# Patient Record
Sex: FEMALE | Race: WHITE | NOT HISPANIC OR LATINO | Employment: OTHER | URBAN - NONMETROPOLITAN AREA
[De-identification: names, ages, dates, MRNs, and addresses within clinical notes are randomized per-mention and may not be internally consistent; named-entity substitution may affect disease eponyms.]

---

## 2019-01-08 ENCOUNTER — OUTSIDE FACILITY SERVICE (OUTPATIENT)
Dept: CARDIOLOGY | Facility: CLINIC | Age: 84
End: 2019-01-08

## 2019-01-10 PROCEDURE — 93227 XTRNL ECG REC<48 HR R&I: CPT | Performed by: INTERNAL MEDICINE

## 2019-01-29 RX ORDER — LANOLIN ALCOHOL/MO/W.PET/CERES
1000 CREAM (GRAM) TOPICAL DAILY
COMMUNITY
End: 2020-04-13

## 2019-01-29 RX ORDER — ALPRAZOLAM 0.5 MG/1
0.5 TABLET ORAL 2 TIMES DAILY PRN
COMMUNITY

## 2019-01-29 RX ORDER — ZOLPIDEM TARTRATE 12.5 MG/1
12.5 TABLET, FILM COATED, EXTENDED RELEASE ORAL NIGHTLY
COMMUNITY

## 2019-01-29 RX ORDER — MELATONIN
1000 DAILY
COMMUNITY
End: 2020-04-13 | Stop reason: ALTCHOICE

## 2019-01-29 RX ORDER — AMLODIPINE BESYLATE AND BENAZEPRIL HYDROCHLORIDE 10; 20 MG/1; MG/1
1 CAPSULE ORAL DAILY
COMMUNITY

## 2019-01-29 RX ORDER — GABAPENTIN 300 MG/1
300 CAPSULE ORAL 3 TIMES DAILY
COMMUNITY

## 2019-01-29 RX ORDER — TRAMADOL HYDROCHLORIDE 50 MG/1
50 TABLET ORAL 3 TIMES DAILY PRN
COMMUNITY

## 2019-02-01 ENCOUNTER — OFFICE VISIT (OUTPATIENT)
Dept: CARDIOLOGY | Facility: CLINIC | Age: 84
End: 2019-02-01

## 2019-02-01 VITALS
HEART RATE: 81 BPM | BODY MASS INDEX: 24.17 KG/M2 | WEIGHT: 136.4 LBS | SYSTOLIC BLOOD PRESSURE: 156 MMHG | DIASTOLIC BLOOD PRESSURE: 82 MMHG | OXYGEN SATURATION: 98 % | HEIGHT: 63 IN

## 2019-02-01 DIAGNOSIS — R00.2 PALPITATIONS: ICD-10-CM

## 2019-02-01 DIAGNOSIS — R94.31 ABNORMAL EKG: ICD-10-CM

## 2019-02-01 DIAGNOSIS — I10 ESSENTIAL HYPERTENSION: ICD-10-CM

## 2019-02-01 DIAGNOSIS — R55 SYNCOPE, UNSPECIFIED SYNCOPE TYPE: ICD-10-CM

## 2019-02-01 DIAGNOSIS — R06.02 SHORTNESS OF BREATH: ICD-10-CM

## 2019-02-01 DIAGNOSIS — R07.89 OTHER CHEST PAIN: Primary | ICD-10-CM

## 2019-02-01 PROCEDURE — 99204 OFFICE O/P NEW MOD 45 MIN: CPT | Performed by: NURSE PRACTITIONER

## 2019-02-01 PROCEDURE — 93000 ELECTROCARDIOGRAM COMPLETE: CPT | Performed by: NURSE PRACTITIONER

## 2019-02-01 NOTE — PROGRESS NOTES
Subjective:     Encounter Date:02/01/2019    Chief Complaint:    Patient ID: Kim Davison is a 83 y.o. female here today for cardiac evaluation at the request of Winifred Kramer NP for syncope.     Syncopal episode that occurred on 12/24/2018 while preparing dinner.  Has not recurred.  She states that she felt funny but did not sit down.  The next thing she knew her  was waking her up on the floor.  She did not seek medical attention.      Heart Problem   This is a new problem. The current episode started more than 1 month ago. The problem occurs rarely. The problem has been resolved. Associated symptoms include chest pain (sometimes has sharp L chest for a few seconds when lying on the couch), numbness (tingling L hand if sitting on the couch or lying on her arm) and weakness (for several months, all over - she attributes to her age). Pertinent negatives include no abdominal pain, chills, congestion, coughing, fever, headaches, joint swelling, nausea, neck pain, rash or vomiting. Nothing aggravates the symptoms. She has tried rest for the symptoms. The treatment provided moderate relief.     History:   Past Medical History:   Diagnosis Date   • Abnormal EKG 2/1/2019   • Heart murmur    • Hyperglycemia    • Hypertension    • Insomnia    • Neuropathy    • Skin cancer     unknown what type     Past Surgical History:   Procedure Laterality Date   • APPENDECTOMY  03/20/2007   • BREAST BIOPSY     • CATARACT EXTRACTION      with lens implants   • HEMORRHOIDECTOMY  03/20/2007   • LUMBAR SPINE SURGERY  2002   • SKIN CANCER EXCISION       Social History     Socioeconomic History   • Marital status:      Spouse name: Not on file   • Number of children: Not on file   • Years of education: Not on file   • Highest education level: Not on file   Social Needs   • Financial resource strain: Not on file   • Food insecurity - worry: Not on file   • Food insecurity - inability: Not on file   • Transportation needs -  medical: Not on file   • Transportation needs - non-medical: Not on file   Occupational History   • Not on file   Tobacco Use   • Smoking status: Current Every Day Smoker     Packs/day: 0.25     Years: 10.00     Pack years: 2.50     Types: Cigarettes     Start date: 1956   • Smokeless tobacco: Never Used   • Tobacco comment: smokes 3 per day, less than 1/2 ppd for years - only able to completely quit for 2-3 months at a time, quit several times   Substance and Sexual Activity   • Alcohol use: No     Frequency: Never   • Drug use: No   • Sexual activity: Defer   Other Topics Concern   • Not on file   Social History Narrative   • Not on file     Family History   Problem Relation Age of Onset   • Stomach cancer Mother    • Heart attack Father    • Lymphoma Sister    • Heart disease Brother        Outpatient Medications Marked as Taking for the 2/1/19 encounter (Office Visit) with Odilia Cline APRN   Medication Sig Dispense Refill   • ALPRAZolam (XANAX) 0.5 MG tablet Take 0.5 mg by mouth 2 (Two) Times a Day As Needed.     • amLODIPine-benazepril (LOTREL) 10-20 MG per capsule Take 1 capsule by mouth Daily.     • cholecalciferol (VITAMIN D3) 1000 units tablet Take 1,000 Units by mouth Daily.     • gabapentin (NEURONTIN) 300 MG capsule Take 300 mg by mouth 3 (Three) Times a Day.     • traMADol (ULTRAM) 50 MG tablet Take 50 mg by mouth 3 (Three) Times a Day As Needed.     • vitamin B-12 (CYANOCOBALAMIN) 1000 MCG tablet Take 1,000 mcg by mouth Daily.     • zolpidem CR (AMBIEN CR) 12.5 MG CR tablet Take 12.5 mg by mouth Every Night.         Review of Systems:  Review of Systems   Constitution: Positive for weakness (for several months, all over - she attributes to her age) and malaise/fatigue. Negative for chills, decreased appetite and fever.   HENT: Negative for congestion and nosebleeds.    Eyes: Negative for blurred vision and double vision.   Cardiovascular: Positive for chest pain (sometimes has sharp L chest  "for a few seconds when lying on the couch), irregular heartbeat, near-syncope, palpitations and syncope. Negative for leg swelling.   Respiratory: Positive for shortness of breath (walking up steps - doesn't have to stop and rest, not exercising regularly). Negative for cough.    Endocrine: Negative for cold intolerance and heat intolerance.   Skin: Negative for dry skin and rash.   Musculoskeletal: Positive for back pain (went to [facility] but didn't keep doing exercises they recommended). Negative for falls, joint pain, joint swelling, neck pain and stiffness.   Gastrointestinal: Negative for abdominal pain, constipation, diarrhea, heartburn, nausea and vomiting.   Neurological: Positive for loss of balance and numbness (tingling L hand if sitting on the couch or lying on her arm). Negative for dizziness, headaches and light-headedness.   Psychiatric/Behavioral: Positive for depression. The patient has insomnia and is nervous/anxious ( worse in the afternoon, gets \"all keyed\" up - better with anxiety pill).             Objective:   /82 (BP Location: Left arm, Patient Position: Sitting, Cuff Size: Adult)   Pulse 81   Ht 160 cm (63\")   Wt 61.9 kg (136 lb 6.4 oz)   SpO2 98%   BMI 24.16 kg/m²   Wt Readings from Last 3 Encounters:   02/01/19 61.9 kg (136 lb 6.4 oz)         Physical Exam   Constitutional: She is oriented to person, place, and time. She appears well-developed and well-nourished.   HENT:   Head: Normocephalic and atraumatic.   Right Ear: External ear normal.   Left Ear: External ear normal.   Nose: Nose normal.   Mouth/Throat: Oropharynx is clear and moist.   Eyes: Conjunctivae and EOM are normal. Pupils are equal, round, and reactive to light. Right eye exhibits no discharge. Left eye exhibits no discharge. No scleral icterus.   Neck: Normal range of motion. Neck supple. No JVD present. No tracheal deviation present. No thyromegaly present.   Cardiovascular: Normal rate and regular rhythm. Exam " reveals no gallop and no friction rub.   No murmur heard.  Pulmonary/Chest: Effort normal and breath sounds normal. She has no wheezes. She has no rales.   Abdominal: Soft. Bowel sounds are normal. She exhibits no mass. There is no tenderness. There is no rebound and no guarding.   Musculoskeletal: She exhibits tenderness (BLEs). She exhibits no edema or deformity.   Lymphadenopathy:     She has no cervical adenopathy.   Neurological: She is alert and oriented to person, place, and time. No cranial nerve deficit.   Skin: Skin is warm and dry.   Psychiatric: She has a normal mood and affect. Her behavior is normal. Judgment and thought content normal.   Vitals reviewed.    Lab/Diagnostics Review:   1/8/2019 24-hour Holter monitor sinus rhythm with rare atrial and ventricular ectopic beats.  No significant pauses, arrhythmias, or events.  No symptoms reported.  A benign monitor study.  As read by Dr. Heath Hull.    7/11/2018 BMP sodium 144 potassium 4.5 chloride 106 CO2 28 creatinine 0.8 BUN 14 calcium 9.6 glucose 119    7/9/2018  CBC WBC 4000, hemoglobin 15, hematocrit 46%, platelets 303,000  Lipid panel total cholesterol 190 triglycerides 112 HDL 61   TSH 1.39 free T4 0.89 free T3 2.67      ECG 12 Lead  Date/Time: 2/1/2019 9:27 AM  Performed by: Odilia Cline APRN  Authorized by: Odilia Cline APRN   Comparison: compared with previous ECG from 7/24/2017  Similar to previous ECG  Comparison to previous ECG: Poor copy quality of previous EKG but no gross changes  Rhythm: sinus rhythm  Rate: normal  BPM: 82  QRS axis: left  Clinical impression: abnormal ECG                Assessment/Plan:         Problem List Items Addressed This Visit        Cardiovascular and Mediastinum    Syncope    Current Assessment & Plan     Unclear etiology. Will r/o myocardial ischemia and VHD.          Relevant Orders    Adult Transthoracic Echo Complete W/ Cont if Necessary Per Protocol    Adult Stress Echo W/ Cont or  Stress Agent if Necessary Per Protocol    Palpitations    Current Assessment & Plan     No significant arrhythmia on 24 Hr Holter         Relevant Orders    Adult Transthoracic Echo Complete W/ Cont if Necessary Per Protocol    Adult Stress Echo W/ Cont or Stress Agent if Necessary Per Protocol    Abnormal EKG    Relevant Orders    Adult Transthoracic Echo Complete W/ Cont if Necessary Per Protocol    Adult Stress Echo W/ Cont or Stress Agent if Necessary Per Protocol    Hypertension    Current Assessment & Plan     Not controlled per today's reading. To check at home at least 3 times per week and call readings. May need to increase or add medications.          Relevant Medications    amLODIPine-benazepril (LOTREL) 10-20 MG per capsule    Other Relevant Orders    Adult Transthoracic Echo Complete W/ Cont if Necessary Per Protocol    Adult Stress Echo W/ Cont or Stress Agent if Necessary Per Protocol       Respiratory    Shortness of breath    Relevant Orders    Adult Transthoracic Echo Complete W/ Cont if Necessary Per Protocol    Adult Stress Echo W/ Cont or Stress Agent if Necessary Per Protocol       Nervous and Auditory    Other chest pain - Primary    Current Assessment & Plan     Somewhat atypical but with other symptoms and risk factors. Check echocardiogram and stress echocardiogram.          Relevant Orders    ECG 12 Lead    Adult Transthoracic Echo Complete W/ Cont if Necessary Per Protocol    Adult Stress Echo W/ Cont or Stress Agent if Necessary Per Protocol        Return in about 2 months (around 4/1/2019) for Recheck.           Odilia Cline, APRN, ACNP-BC, CHFN-BC

## 2019-02-01 NOTE — ASSESSMENT & PLAN NOTE
Not controlled per today's reading. To check at home at least 3 times per week and call readings. May need to increase or add medications.

## 2019-02-01 NOTE — PATIENT INSTRUCTIONS
Nonspecific Chest Pain  Chest pain can be caused by many different conditions. There is always a chance that your pain could be related to something serious, such as a heart attack or a blood clot in your lungs. Chest pain can also be caused by conditions that are not life-threatening. If you have chest pain, it is very important to follow up with your health care provider.  What are the causes?  Causes of this condition include:  · Heartburn.  · Pneumonia or bronchitis.  · Anxiety or stress.  · Inflammation around your heart (pericarditis) or lung (pleuritis or pleurisy).  · A blood clot in your lung.  · A collapsed lung (pneumothorax). This can develop suddenly on its own (spontaneous pneumothorax) or from trauma to the chest.  · Shingles infection (varicella-zoster virus).  · Heart attack.  · Damage to the bones, muscles, and cartilage that make up your chest wall. This can include:  ? Bruised bones due to injury.  ? Strained muscles or cartilage due to frequent or repeated coughing or overwork.  ? Fracture to one or more ribs.  ? Sore cartilage due to inflammation (costochondritis).    What increases the risk?  Risk factors for this condition may include:  · Activities that increase your risk for trauma or injury to your chest.  · Respiratory infections or conditions that cause frequent coughing.  · Medical conditions or overeating that can cause heartburn.  · Heart disease or family history of heart disease.  · Conditions or health behaviors that increase your risk of developing a blood clot.  · Having had chicken pox (varicella zoster).    What are the signs or symptoms?  Chest pain can feel like:  · Burning or tingling on the surface of your chest or deep in your chest.  · Crushing, pressure, aching, or squeezing pain.  · Dull or sharp pain that is worse when you move, cough, or take a deep breath.  · Pain that is also felt in your back, neck, shoulder, or arm, or pain that spreads to any of these  areas.    Your chest pain may come and go, or it may stay constant.  How is this diagnosed?  Lab tests or other studies may be needed to find the cause of your pain. Your health care provider may have you take a test called an ECG (electrocardiogram). An ECG records your heartbeat patterns at the time the test is performed. You may also have other tests, such as:  · Transthoracic echocardiogram (TTE). In this test, sound waves are used to create a picture of the heart structures and to look at how blood flows through your heart.  · Transesophageal echocardiogram (VALERIA). This is a more advanced imaging test that takes images from inside your body. It allows your health care provider to see your heart in finer detail.  · Cardiac monitoring. This allows your health care provider to monitor your heart rate and rhythm in real time.  · Holter monitor. This is a portable device that records your heartbeat and can help to diagnose abnormal heartbeats. It allows your health care provider to track your heart activity for several days, if needed.  · Stress tests. These can be done through exercise or by taking medicine that makes your heart beat more quickly.  · Blood tests.  · Other imaging tests.    How is this treated?  Treatment depends on what is causing your chest pain. Treatment may include:  · Medicines. These may include:  ? Acid blockers for heartburn.  ? Anti-inflammatory medicine.  ? Pain medicine for inflammatory conditions.  ? Antibiotic medicine, if an infection is present.  ? Medicines to dissolve blood clots.  ? Medicines to treat coronary artery disease (CAD).  · Supportive care for conditions that do not require medicines. This may include:  ? Resting.  ? Applying heat or cold packs to injured areas.  ? Limiting activities until pain decreases.    Follow these instructions at home:  Medicines  · If you were prescribed an antibiotic, take it as told by your health care provider. Do not stop taking the  antibiotic even if you start to feel better.  · Take over-the-counter and prescription medicines only as told by your health care provider.  Lifestyle  · Do not use any products that contain nicotine or tobacco, such as cigarettes and e-cigarettes. If you need help quitting, ask your health care provider.  · Do not drink alcohol.  · Make lifestyle changes as directed by your health care provider. These may include:  ? Getting regular exercise. Ask your health care provider to suggest some activities that are safe for you.  ? Eating a heart-healthy diet. A registered dietitian can help you to learn healthy eating options.  ? Maintaining a healthy weight.  ? Managing diabetes, if necessary.  ? Reducing stress, such as with yoga or relaxation techniques.  General instructions  · Avoid any activities that bring on chest pain.  · If heartburn is the cause for your chest pain, raise (elevate) the head of your bed about 6 inches (15 cm) by putting blocks under the legs. Sleeping with more pillows does not effectively relieve heartburn because it only changes the position of your head.  · Keep all follow-up visits as told by your health care provider. This is important. This includes any further testing if your chest pain does not go away.  Contact a health care provider if:  · Your chest pain does not go away.  · You have a rash with blisters on your chest.  · You have a fever.  · You have chills.  Get help right away if:  · Your chest pain is worse.  · You have a cough that gets worse, or you cough up blood.  · You have severe pain in your abdomen.  · You have severe weakness.  · You faint.  · You have sudden, unexplained chest discomfort.  · You have sudden, unexplained discomfort in your arms, back, neck, or jaw.  · You have shortness of breath at any time.  · You suddenly start to sweat, or your skin gets clammy.  · You feel nauseous or you vomit.  · You suddenly feel light-headed or dizzy.  · Your heart begins to beat  quickly, or it feels like it is skipping beats.  These symptoms may represent a serious problem that is an emergency. Do not wait to see if the symptoms will go away. Get medical help right away. Call your local emergency services (911 in the [place] Do not drive yourself to the hospital.  This information is not intended to replace advice given to you by your health care provider. Make sure you discuss any questions you have with your health care provider.  Document Released: 09/27/2006 Document Revised: 09/11/2017 Document Reviewed: 09/11/2017  [employer] Patient Education © 2017 [employer]  Syncope  Syncope is when you temporarily lose consciousness. Syncope may also be called fainting or passing out. It is caused by a sudden decrease in blood flow to the brain. Even though most causes of syncope are not dangerous, syncope can be a sign of a serious medical problem. Signs that you may be about to faint include:  · Feeling dizzy or light-headed.  · Feeling nauseous.  · Seeing all white or all black in your field of vision.  · Having cold, clammy skin.    If you fainted, get medical help right away.Call your local emergency services (911 in the [place] Do not drive yourself to the hospital.  Follow these instructions at home:  Pay attention to any changes in your symptoms. Take these actions to help with your condition:  · Have someone stay with you until you feel stable.  · Do not drive, use machinery, or play sports until your health care provider says it is okay.  · Keep all follow-up visits as told by your health care provider. This is important.  · If you start to feel like you might faint, lie down right away and raise (elevate) your feet above the level of your heart. Breathe deeply and steadily. Wait until all of the symptoms have passed.  · Drink enough fluid to keep your urine clear or pale yellow.  · If you are taking blood pressure or heart medicine, get up slowly and take several minutes to  sit and then stand. This can reduce dizziness.  · Take over-the-counter and prescription medicines only as told by your health care provider.    Get help right away if:  · You have a severe headache.  · You have unusual pain in your chest, abdomen, or back.  · You are bleeding from your mouth or rectum, or you have black or tarry stool.  · You have a very fast or irregular heartbeat (palpitations).  · You have pain with breathing.  · You faint once or repeatedly.  · You have a seizure.  · You are confused.  · You have trouble walking.  · You have severe weakness.  · You have vision problems.  These symptoms may represent a serious problem that is an emergency. Do not wait to see if your symptoms will go away. Get medical help right away. Call your local emergency services (911 in the [place] Do not drive yourself to the hospital.  This information is not intended to replace advice given to you by your health care provider. Make sure you discuss any questions you have with your health care provider.  Document Released: 12/18/2006 Document Revised: 05/25/2017 Document Reviewed: 08/31/2016  [employer] Interactive Patient Education © 2018 [employer]

## 2019-02-13 ENCOUNTER — OUTSIDE FACILITY SERVICE (OUTPATIENT)
Dept: CARDIOLOGY | Facility: CLINIC | Age: 84
End: 2019-02-13

## 2019-02-13 PROCEDURE — 93010 ELECTROCARDIOGRAM REPORT: CPT | Performed by: INTERNAL MEDICINE

## 2019-02-14 DIAGNOSIS — R94.31 ABNORMAL EKG: ICD-10-CM

## 2019-02-14 DIAGNOSIS — R55 SYNCOPE, UNSPECIFIED SYNCOPE TYPE: ICD-10-CM

## 2019-02-14 DIAGNOSIS — R06.02 SHORTNESS OF BREATH: ICD-10-CM

## 2019-02-14 DIAGNOSIS — R07.89 OTHER CHEST PAIN: ICD-10-CM

## 2019-02-14 DIAGNOSIS — I10 ESSENTIAL HYPERTENSION: ICD-10-CM

## 2019-02-14 DIAGNOSIS — R00.2 PALPITATIONS: ICD-10-CM

## 2019-02-14 NOTE — PROGRESS NOTES
Please notify patient of fairly normal echocardiogram and stress echocardiogram is low risk of ischemia. Nothing to explain her syncopal episode. TX!

## 2019-04-08 ENCOUNTER — OFFICE VISIT (OUTPATIENT)
Dept: CARDIOLOGY | Facility: CLINIC | Age: 84
End: 2019-04-08

## 2019-04-08 VITALS
WEIGHT: 137 LBS | SYSTOLIC BLOOD PRESSURE: 122 MMHG | DIASTOLIC BLOOD PRESSURE: 72 MMHG | HEART RATE: 98 BPM | HEIGHT: 63 IN | BODY MASS INDEX: 24.27 KG/M2 | OXYGEN SATURATION: 97 %

## 2019-04-08 DIAGNOSIS — R55 SYNCOPE, UNSPECIFIED SYNCOPE TYPE: Primary | ICD-10-CM

## 2019-04-08 DIAGNOSIS — R00.2 PALPITATIONS: ICD-10-CM

## 2019-04-08 DIAGNOSIS — I10 ESSENTIAL HYPERTENSION: Chronic | ICD-10-CM

## 2019-04-08 PROBLEM — R07.89 OTHER CHEST PAIN: Status: RESOLVED | Noted: 2019-02-01 | Resolved: 2019-04-08

## 2019-04-08 PROCEDURE — 99214 OFFICE O/P EST MOD 30 MIN: CPT | Performed by: NURSE PRACTITIONER

## 2019-04-08 NOTE — ASSESSMENT & PLAN NOTE
Check tilt table test and extended Holter monitor with diary. If unremarkable will consider referral to neurology.

## 2019-04-08 NOTE — PROGRESS NOTES
"    Subjective:     Encounter Date:04/08/2019    Chief Complaint:    Patient ID: Kim Davison is a 83 y.o. female here today for 2 month cardiac follow-up.    HPI     Chest Pain      Additional comments: low risk SE and echo normal for her age              Loss of Consciousness      Additional comments: passed out again last week after getting up to change clothes in the middle of the night, doesn't remember falling or hitting L eye orbit (did not seek medical attention), no warning,  heard her fall              Palpitations      Additional comments: feels like heart races almost every afternoon for several minutes - aggravating or relieving factors - no significant arrhythmia on previous 24 Hr Holter              Hypertension      Additional comments: hasn't been checking at home, has been \"alright\" at office visits          Last edited by Odilia Cline APRN on 4/8/2019 10:22 AM. (History)        History:   Past Medical History:   Diagnosis Date   • Abnormal EKG 2/1/2019   • Heart murmur    • Hyperglycemia    • Hypertension    • Insomnia    • Neuropathy    • Skin cancer     unknown what type   • Syncope      Past Surgical History:   Procedure Laterality Date   • APPENDECTOMY  03/20/2007   • BREAST BIOPSY     • CATARACT EXTRACTION      with lens implants   • HEMORRHOIDECTOMY  03/20/2007   • LUMBAR SPINE SURGERY  2002   • SKIN CANCER EXCISION       Social History     Socioeconomic History   • Marital status:      Spouse name: Not on file   • Number of children: Not on file   • Years of education: Not on file   • Highest education level: Not on file   Tobacco Use   • Smoking status: Current Every Day Smoker     Packs/day: 0.25     Years: 10.00     Pack years: 2.50     Types: Cigarettes     Start date: 1956   • Smokeless tobacco: Never Used   • Tobacco comment: smokes 1-3 cigs per day, less than 1/2 ppd for years - only able to completely quit for 2-3 months at a time, quit several times "   Substance and Sexual Activity   • Alcohol use: No     Frequency: Never   • Drug use: No   • Sexual activity: Defer     Family History   Problem Relation Age of Onset   • Stomach cancer Mother    • Heart attack Father    • Lymphoma Sister    • Heart disease Brother        Outpatient Medications Marked as Taking for the 4/8/19 encounter (Office Visit) with Odilia Cline APRN   Medication Sig Dispense Refill   • ALPRAZolam (XANAX) 0.5 MG tablet Take 0.5 mg by mouth 2 (Two) Times a Day As Needed.     • amLODIPine-benazepril (LOTREL) 10-20 MG per capsule Take 1 capsule by mouth Daily.     • cholecalciferol (VITAMIN D3) 1000 units tablet Take 1,000 Units by mouth Daily.     • gabapentin (NEURONTIN) 300 MG capsule Take 300 mg by mouth 3 (Three) Times a Day.     • traMADol (ULTRAM) 50 MG tablet Take 50 mg by mouth 3 (Three) Times a Day As Needed.     • vitamin B-12 (CYANOCOBALAMIN) 1000 MCG tablet Take 1,000 mcg by mouth Daily.     • zolpidem CR (AMBIEN CR) 12.5 MG CR tablet Take 12.5 mg by mouth Every Night.         Review of Systems:  Review of Systems   Constitution: Positive for weakness (for several months, all over - she attributes to her age) and malaise/fatigue. Negative for chills, decreased appetite and fever.   HENT: Negative for congestion and nosebleeds.    Eyes: Negative for blurred vision and double vision.   Cardiovascular: Positive for irregular heartbeat, near-syncope, palpitations and syncope. Negative for chest pain, dyspnea on exertion and leg swelling.   Respiratory: Negative for cough and shortness of breath (walking up steps - doesn't have to stop and rest, not exercising regularly ).    Endocrine: Negative for cold intolerance and heat intolerance.   Hematologic/Lymphatic: Bruises/bleeds easily.   Skin: Negative for dry skin and rash.   Musculoskeletal: Positive for back pain (went to [facility] but didn't keep doing exercises they recommended) and falls. Negative for joint pain, joint swelling,  "neck pain and stiffness.   Gastrointestinal: Negative for abdominal pain, constipation, diarrhea, heartburn, nausea and vomiting.   Neurological: Positive for loss of balance and numbness (tingling L hand if sitting on the couch or lying on her arm). Negative for dizziness, headaches and light-headedness.   Psychiatric/Behavioral: Positive for depression. The patient has insomnia and is nervous/anxious ( worse in the afternoon, gets \"all keyed\" up - better with anxiety pill).           Objective:   /72 (BP Location: Left arm, Patient Position: Sitting, Cuff Size: Adult)   Pulse 98   Ht 160 cm (63\")   Wt 62.1 kg (137 lb)   SpO2 97%   BMI 24.27 kg/m²   Wt Readings from Last 3 Encounters:   04/08/19 62.1 kg (137 lb)   02/01/19 61.9 kg (136 lb 6.4 oz)         Physical Exam   Constitutional: She is oriented to person, place, and time. She appears well-developed and well-nourished.   Eyes: No scleral icterus.   Neck: Normal carotid pulses and no JVD present. Carotid bruit is not present.   Cardiovascular: Normal rate, regular rhythm, normal heart sounds and intact distal pulses. Exam reveals no gallop and no friction rub.   No murmur heard.  Pulmonary/Chest: Effort normal and breath sounds normal.   Musculoskeletal: She exhibits tenderness (BLEs). She exhibits no edema.   Neurological: She is alert and oriented to person, place, and time.   Skin: Skin is warm and dry.   Psychiatric: She has a normal mood and affect.   Vitals reviewed.      Lab/Diagnostics Review:   2/13/2019  Stress echocardiogram clinically and electrically negative for ischemia, functional capacity is fair, low risk for ischemia   Echocardiogram EF 60-65% grade 1 diastolic dysfunction, mild AI, normal RV size and function, trivial MR, trivial PI.    1/8/2019 24-hour Holter monitor sinus rhythm with rare atrial and ventricular ectopic beats.  No significant pauses, arrhythmias, or events.  No symptoms reported.  A benign monitor study.  As " read by Dr. Heath Hull.     7/11/2018 BMP sodium 144 potassium 4.5 chloride 106 CO2 28 creatinine 0.8 BUN 14 calcium 9.6 glucose 119     7/9/2018  CBC WBC 4000, hemoglobin 15, hematocrit 46%, platelets 303,000  Lipid panel total cholesterol 190 triglycerides 112 HDL 61   TSH 1.39 free T4 0.89 free T3 2.67      ECG 12 Lead  Date/Time: 2/1/2019 9:27 AM  Performed by: Odilia Cline APRN  Authorized by: Odilia Cline APRN   Comparison: compared with previous ECG from 7/24/2017  Similar to previous ECG  Comparison to previous ECG: Poor copy quality of previous EKG but no gross changes  Rhythm: sinus rhythm  Rate: normal  BPM: 82  QRS axis: left  Clinical impression: abnormal ECG    Procedures: none in office today        Assessment/Plan:         Problem List Items Addressed This Visit        Cardiovascular and Mediastinum    Syncope - Primary (Chronic)    Current Assessment & Plan     Check tilt table test and extended Holter monitor with diary. If unremarkable will consider referral to neurology.          Relevant Orders    Tilt Table    Holter Monitor - 72 Hour Up To 21 Days    Palpitations (Chronic)    Current Assessment & Plan     Check extended 14 day Holter with diary.          Relevant Orders    Holter Monitor - 72 Hour Up To 21 Days    Hypertension (Chronic)    Current Assessment & Plan     Well controlled with medications. Continue present therapy but avoid hypotension given syncope.                Return in about 3 months (around 7/8/2019) for Recheck.           RAVEN Rose, ACNP-BC, CHFN-BC

## 2019-04-08 NOTE — PATIENT INSTRUCTIONS
Syncope  Syncope is when you temporarily lose consciousness. Syncope may also be called fainting or passing out. It is caused by a sudden decrease in blood flow to the brain. Even though most causes of syncope are not dangerous, syncope can be a sign of a serious medical problem. Signs that you may be about to faint include:  · Feeling dizzy or light-headed.  · Feeling nauseous.  · Seeing all white or all black in your field of vision.  · Having cold, clammy skin.    If you fainted, get medical help right away.Call your local emergency services (911 in the [place] Do not drive yourself to the hospital.  Follow these instructions at home:  Pay attention to any changes in your symptoms. Take these actions to help with your condition:  · Have someone stay with you until you feel stable.  · Do not drive, use machinery, or play sports until your health care provider says it is okay.  · Keep all follow-up visits as told by your health care provider. This is important.  · If you start to feel like you might faint, lie down right away and raise (elevate) your feet above the level of your heart. Breathe deeply and steadily. Wait until all of the symptoms have passed.  · Drink enough fluid to keep your urine clear or pale yellow.  · If you are taking blood pressure or heart medicine, get up slowly and take several minutes to sit and then stand. This can reduce dizziness.  · Take over-the-counter and prescription medicines only as told by your health care provider.    Get help right away if:  · You have a severe headache.  · You have unusual pain in your chest, abdomen, or back.  · You are bleeding from your mouth or rectum, or you have black or tarry stool.  · You have a very fast or irregular heartbeat (palpitations).  · You have pain with breathing.  · You faint once or repeatedly.  · You have a seizure.  · You are confused.  · You have trouble walking.  · You have severe weakness.  · You have vision problems.  These  symptoms may represent a serious problem that is an emergency. Do not wait to see if your symptoms will go away. Get medical help right away. Call your local emergency services (911 in the [place] Do not drive yourself to the hospital.  This information is not intended to replace advice given to you by your health care provider. Make sure you discuss any questions you have with your health care provider.  Document Released: 12/18/2006 Document Revised: 05/25/2017 Document Reviewed: 08/31/2016  [employer]vier Interactive Patient Education © 2018 [employer]

## 2019-04-24 ENCOUNTER — OUTSIDE FACILITY SERVICE (OUTPATIENT)
Dept: CARDIOLOGY | Facility: CLINIC | Age: 84
End: 2019-04-24

## 2019-04-24 PROCEDURE — 93660 TILT TABLE EVALUATION: CPT | Performed by: INTERNAL MEDICINE

## 2019-04-26 DIAGNOSIS — R55 SYNCOPE, UNSPECIFIED SYNCOPE TYPE: ICD-10-CM

## 2019-04-26 NOTE — PROGRESS NOTES
Please notify patient her tilt table test was normal except it did show her BP drops with standing. She needs to wear compression socks, stay hydrated, move her legs and feet before standing, get up slowly, and not sit for long periods of time. Await results of heart monitor. If doesn't improve with conservative measures she needs to call - may need to stop the HCTZ in her BP med. TX!

## 2019-07-08 ENCOUNTER — OFFICE VISIT (OUTPATIENT)
Dept: CARDIOLOGY | Facility: CLINIC | Age: 84
End: 2019-07-08

## 2019-07-08 VITALS
HEIGHT: 63 IN | DIASTOLIC BLOOD PRESSURE: 100 MMHG | HEART RATE: 86 BPM | BODY MASS INDEX: 23.88 KG/M2 | WEIGHT: 134.8 LBS | OXYGEN SATURATION: 96 % | SYSTOLIC BLOOD PRESSURE: 172 MMHG

## 2019-07-08 DIAGNOSIS — I44.1 ATRIOVENTRICULAR BLOCK, MOBITZ TYPE 1, WENCKEBACH: Chronic | ICD-10-CM

## 2019-07-08 DIAGNOSIS — R55 SYNCOPE, UNSPECIFIED SYNCOPE TYPE: Chronic | ICD-10-CM

## 2019-07-08 DIAGNOSIS — I47.1 SVT (SUPRAVENTRICULAR TACHYCARDIA) (HCC): Chronic | ICD-10-CM

## 2019-07-08 DIAGNOSIS — I95.1 ORTHOSTASIS: Chronic | ICD-10-CM

## 2019-07-08 DIAGNOSIS — I10 ESSENTIAL HYPERTENSION: Primary | Chronic | ICD-10-CM

## 2019-07-08 DIAGNOSIS — R00.2 PALPITATIONS: Chronic | ICD-10-CM

## 2019-07-08 PROBLEM — I47.10 SVT (SUPRAVENTRICULAR TACHYCARDIA): Chronic | Status: ACTIVE | Noted: 2019-07-08

## 2019-07-08 PROCEDURE — 99214 OFFICE O/P EST MOD 30 MIN: CPT | Performed by: NURSE PRACTITIONER

## 2019-07-08 PROCEDURE — 93000 ELECTROCARDIOGRAM COMPLETE: CPT | Performed by: NURSE PRACTITIONER

## 2019-07-08 RX ORDER — HYDROCHLOROTHIAZIDE 25 MG/1
25 TABLET ORAL DAILY
Qty: 30 TABLET | Refills: 11 | Status: SHIPPED | OUTPATIENT
Start: 2019-07-08 | End: 2020-07-07

## 2019-07-08 RX ORDER — ASPIRIN 81 MG/1
81 TABLET ORAL DAILY
COMMUNITY

## 2019-07-08 NOTE — PATIENT INSTRUCTIONS
"DASH Eating Plan  DASH stands for \"Dietary Approaches to Stop Hypertension.\" The DASH eating plan is a healthy eating plan that has been shown to reduce high blood pressure (hypertension). It may also reduce your risk for type 2 diabetes, heart disease, and stroke. The DASH eating plan may also help with weight loss.  What are tips for following this plan?  General guidelines  · Avoid eating more than 2,300 mg (milligrams) of salt (sodium) a day. If you have hypertension, you may need to reduce your sodium intake to 1,500 mg a day.  · Limit alcohol intake to no more than 1 drink a day for nonpregnant women and 2 drinks a day for men. One drink equals 12 oz of beer, 5 oz of wine, or 1½ oz of hard liquor.  · Work with your health care provider to maintain a healthy body weight or to lose weight. Ask what an ideal weight is for you.  · Get at least 30 minutes of exercise that causes your heart to beat faster (aerobic exercise) most days of the week. Activities may include walking, swimming, or biking.  · Work with your health care provider or diet and nutrition specialist (dietitian) to adjust your eating plan to your individual calorie needs.  Reading food labels  · Check food labels for the amount of sodium per serving. Choose foods with less than 5 percent of the Daily Value of sodium. Generally, foods with less than 300 mg of sodium per serving fit into this eating plan.  · To find whole grains, look for the word \"whole\" as the first word in the ingredient list.  Shopping  · Buy products labeled as \"low-sodium\" or \"no salt added.\"  · Buy fresh foods. Avoid canned foods and premade or frozen meals.  Cooking  · Avoid adding salt when cooking. Use salt-free seasonings or herbs instead of table salt or sea salt. Check with your health care provider or pharmacist before using salt substitutes.  · Do not ny foods. Cook foods using healthy methods such as baking, boiling, grilling, and broiling instead.  · Cook with " heart-healthy oils, such as olive, canola, soybean, or sunflower oil.  Meal planning    · Eat a balanced diet that includes:  ? 5 or more servings of fruits and vegetables each day. At each meal, try to fill half of your plate with fruits and vegetables.  ? Up to 6-8 servings of whole grains each day.  ? Less than 6 oz of lean meat, poultry, or fish each day. A 3-oz serving of meat is about the same size as a deck of cards. One egg equals 1 oz.  ? 2 servings of low-fat dairy each day.  ? A serving of nuts, seeds, or beans 5 times each week.  ? Heart-healthy fats. Healthy fats called Omega-3 fatty acids are found in foods such as flaxseeds and coldwater fish, like sardines, salmon, and mackerel.  · Limit how much you eat of the following:  ? Canned or prepackaged foods.  ? Food that is high in trans fat, such as fried foods.  ? Food that is high in saturated fat, such as fatty meat.  ? Sweets, desserts, sugary drinks, and other foods with added sugar.  ? Full-fat dairy products.  · Do not salt foods before eating.  · Try to eat at least 2 vegetarian meals each week.  · Eat more home-cooked food and less restaurant, buffet, and fast food.  · When eating at a restaurant, ask that your food be prepared with less salt or no salt, if possible.  What foods are recommended?  The items listed may not be a complete list. Talk with your dietitian about what dietary choices are best for you.  Grains  Whole-grain or whole-wheat bread. Whole-grain or whole-wheat pasta. Brown rice. Oatmeal. Quinoa. Bulgur. Whole-grain and low-sodium cereals. Carlyn bread. Low-fat, low-sodium crackers. Whole-wheat flour tortillas.  Vegetables  Fresh or frozen vegetables (raw, steamed, roasted, or grilled). Low-sodium or reduced-sodium tomato and vegetable juice. Low-sodium or reduced-sodium tomato sauce and tomato paste. Low-sodium or reduced-sodium canned vegetables.  Fruits  All fresh, dried, or frozen fruit. Canned fruit in natural juice (without  added sugar).  Meat and other protein foods  Skinless chicken or turkey. Ground chicken or turkey. Pork with fat trimmed off. Fish and seafood. Egg whites. Dried beans, peas, or lentils. Unsalted nuts, nut butters, and seeds. Unsalted canned beans. Lean cuts of beef with fat trimmed off. Low-sodium, lean deli meat.  Dairy  Low-fat (1%) or fat-free (skim) milk. Fat-free, low-fat, or reduced-fat cheeses. Nonfat, low-sodium ricotta or cottage cheese. Low-fat or nonfat yogurt. Low-fat, low-sodium cheese.  Fats and oils  Soft margarine without trans fats. Vegetable oil. Low-fat, reduced-fat, or light mayonnaise and salad dressings (reduced-sodium). Canola, safflower, olive, soybean, and sunflower oils. Avocado.  Seasoning and other foods  Herbs. Spices. Seasoning mixes without salt. Unsalted popcorn and pretzels. Fat-free sweets.  What foods are not recommended?  The items listed may not be a complete list. Talk with your dietitian about what dietary choices are best for you.  Grains  Baked goods made with fat, such as croissants, muffins, or some breads. Dry pasta or rice meal packs.  Vegetables  Creamed or fried vegetables. Vegetables in a cheese sauce. Regular canned vegetables (not low-sodium or reduced-sodium). Regular canned tomato sauce and paste (not low-sodium or reduced-sodium). Regular tomato and vegetable juice (not low-sodium or reduced-sodium). Pickles. Olives.  Fruits  Canned fruit in a light or heavy syrup. Fried fruit. Fruit in cream or butter sauce.  Meat and other protein foods  Fatty cuts of meat. Ribs. Fried meat. Kaiser. Sausage. Bologna and other processed lunch meats. Salami. Fatback. Hotdogs. Bratwurst. Salted nuts and seeds. Canned beans with added salt. Canned or smoked fish. Whole eggs or egg yolks. Chicken or turkey with skin.  Dairy  Whole or 2% milk, cream, and half-and-half. Whole or full-fat cream cheese. Whole-fat or sweetened yogurt. Full-fat cheese. Nondairy creamers. Whipped toppings.  Processed cheese and cheese spreads.  Fats and oils  Butter. Stick margarine. Lard. Shortening. Ghee. Kaiser fat. Tropical oils, such as coconut, palm kernel, or palm oil.  Seasoning and other foods  Salted popcorn and pretzels. Onion salt, garlic salt, seasoned salt, table salt, and sea salt. Worcestershire sauce. Tartar sauce. Barbecue sauce. Teriyaki sauce. Soy sauce, including reduced-sodium. Steak sauce. Canned and packaged gravies. Fish sauce. Oyster sauce. Cocktail sauce. Horseradish that you find on the shelf. Ketchup. Mustard. Meat flavorings and tenderizers. Bouillon cubes. Hot sauce and Tabasco sauce. Premade or packaged marinades. Premade or packaged taco seasonings. Relishes. Regular salad dressings.  Where to find more information:  · National Heart, [employer], [employer] [place]: www.nhlbi.nih.gov  · American Heart Association: www.heart.org  Summary  · The DASH eating plan is a healthy eating plan that has been shown to reduce high blood pressure (hypertension). It may also reduce your risk for type 2 diabetes, heart disease, and stroke.  · With the DASH eating plan, you should limit salt (sodium) intake to 2,300 mg a day. If you have hypertension, you may need to reduce your sodium intake to 1,500 mg a day.  · When on the DASH eating plan, aim to eat more fresh fruits and vegetables, whole grains, lean proteins, low-fat dairy, and heart-healthy fats.  · Work with your health care provider or diet and nutrition specialist (dietitian) to adjust your eating plan to your individual calorie needs.  This information is not intended to replace advice given to you by your health care provider. Make sure you discuss any questions you have with your health care provider.  Document Released: 12/06/2012 Document Revised: 12/11/2017 Document Reviewed: 12/11/2017  [employer] Interactive Patient Education © 2019 [employer]      Hypertension  Hypertension, commonly called high blood pressure, is when the force of blood  "pumping through the arteries is too strong. The arteries are the blood vessels that carry blood from the heart throughout the body. Hypertension forces the heart to work harder to pump blood and may cause arteries to become narrow or stiff. Having untreated or uncontrolled hypertension can cause heart attacks, strokes, kidney disease, and other problems.  A blood pressure reading consists of a higher number over a lower number. Ideally, your blood pressure should be below 120/80. The first (\"top\") number is called the systolic pressure. It is a measure of the pressure in your arteries as your heart beats. The second (\"bottom\") number is called the diastolic pressure. It is a measure of the pressure in your arteries as the heart relaxes.  What are the causes?  The cause of this condition is not known.  What increases the risk?  Some risk factors for high blood pressure are under your control. Others are not.  Factors you can change  · Smoking.  · Having type 2 diabetes mellitus, high cholesterol, or both.  · Not getting enough exercise or physical activity.  · Being overweight.  · Having too much fat, sugar, calories, or salt (sodium) in your diet.  · Drinking too much alcohol.  Factors that are difficult or impossible to change  · Having chronic kidney disease.  · Having a family history of high blood pressure.  · Age. Risk increases with age.  · Race. You may be at higher risk if you are -American.  · Gender. Men are at higher risk than women before age 45. After age 65, women are at higher risk than men.  · Having obstructive sleep apnea.  · Stress.  What are the signs or symptoms?  Extremely high blood pressure (hypertensive crisis) may cause:  · Headache.  · Anxiety.  · Shortness of breath.  · Nosebleed.  · Nausea and vomiting.  · Severe chest pain.  · Jerky movements you cannot control (seizures).    How is this diagnosed?  This condition is diagnosed by measuring your blood pressure while you are " seated, with your arm resting on a surface. The cuff of the blood pressure monitor will be placed directly against the skin of your upper arm at the level of your heart. It should be measured at least twice using the same arm. Certain conditions can cause a difference in blood pressure between your right and left arms.  Certain factors can cause blood pressure readings to be lower or higher than normal (elevated) for a short period of time:  · When your blood pressure is higher when you are in a health care provider's office than when you are at home, this is called white coat hypertension. Most people with this condition do not need medicines.  · When your blood pressure is higher at home than when you are in a health care provider's office, this is called masked hypertension. Most people with this condition may need medicines to control blood pressure.    If you have a high blood pressure reading during one visit or you have normal blood pressure with other risk factors:  · You may be asked to return on a different day to have your blood pressure checked again.  · You may be asked to monitor your blood pressure at home for 1 week or longer.    If you are diagnosed with hypertension, you may have other blood or imaging tests to help your health care provider understand your overall risk for other conditions.  How is this treated?  This condition is treated by making healthy lifestyle changes, such as eating healthy foods, exercising more, and reducing your alcohol intake. Your health care provider may prescribe medicine if lifestyle changes are not enough to get your blood pressure under control, and if:  · Your systolic blood pressure is above 130.  · Your diastolic blood pressure is above 80.    Your personal target blood pressure may vary depending on your medical conditions, your age, and other factors.  Follow these instructions at home:  Eating and drinking  · Eat a diet that is high in fiber and potassium,  and low in sodium, added sugar, and fat. An example eating plan is called the DASH (Dietary Approaches to Stop Hypertension) diet. To eat this way:  ? Eat plenty of fresh fruits and vegetables. Try to fill half of your plate at each meal with fruits and vegetables.  ? Eat whole grains, such as whole wheat pasta, brown rice, or whole grain bread. Fill about one quarter of your plate with whole grains.  ? Eat or drink low-fat dairy products, such as skim milk or low-fat yogurt.  ? Avoid fatty cuts of meat, processed or cured meats, and poultry with skin. Fill about one quarter of your plate with lean proteins, such as fish, chicken without skin, beans, eggs, and tofu.  ? Avoid premade and processed foods. These tend to be higher in sodium, added sugar, and fat.  · Reduce your daily sodium intake. Most people with hypertension should eat less than 1,500 mg of sodium a day.  · Limit alcohol intake to no more than 1 drink a day for nonpregnant women and 2 drinks a day for men. One drink equals 12 oz of beer, 5 oz of wine, or 1½ oz of hard liquor.  Lifestyle  · Work with your health care provider to maintain a healthy body weight or to lose weight. Ask what an ideal weight is for you.  · Get at least 30 minutes of exercise that causes your heart to beat faster (aerobic exercise) most days of the week. Activities may include walking, swimming, or biking.  · Include exercise to strengthen your muscles (resistance exercise), such as pilates or lifting weights, as part of your weekly exercise routine. Try to do these types of exercises for 30 minutes at least 3 days a week.  · Do not use any products that contain nicotine or tobacco, such as cigarettes and e-cigarettes. If you need help quitting, ask your health care provider.  · Monitor your blood pressure at home as told by your health care provider.  · Keep all follow-up visits as told by your health care provider. This is important.  Medicines  · Take over-the-counter and  prescription medicines only as told by your health care provider. Follow directions carefully. Blood pressure medicines must be taken as prescribed.  · Do not skip doses of blood pressure medicine. Doing this puts you at risk for problems and can make the medicine less effective.  · Ask your health care provider about side effects or reactions to medicines that you should watch for.  Contact a health care provider if:  · You think you are having a reaction to a medicine you are taking.  · You have headaches that keep coming back (recurring).  · You feel dizzy.  · You have swelling in your ankles.  · You have trouble with your vision.  Get help right away if:  · You develop a severe headache or confusion.  · You have unusual weakness or numbness.  · You feel faint.  · You have severe pain in your chest or abdomen.  · You vomit repeatedly.  · You have trouble breathing.  Summary  · Hypertension is when the force of blood pumping through your arteries is too strong. If this condition is not controlled, it may put you at risk for serious complications.  · Your personal target blood pressure may vary depending on your medical conditions, your age, and other factors. For most people, a normal blood pressure is less than 120/80.  · Hypertension is treated with lifestyle changes, medicines, or a combination of both. Lifestyle changes include weight loss, eating a healthy, low-sodium diet, exercising more, and limiting alcohol.  This information is not intended to replace advice given to you by your health care provider. Make sure you discuss any questions you have with your health care provider.  Document Released: 12/18/2006 Document Revised: 11/15/2017 Document Reviewed: 11/15/2017  [employer] Interactive Patient Education © 2019 [employer]

## 2019-07-08 NOTE — ASSESSMENT & PLAN NOTE
Uncontrolled. Add HCTZ and check BMP in 2 weeks. Discussed lifestyle modifications including smoking cessation, routine aerobic exericise, and low sodium diet.

## 2019-07-08 NOTE — ASSESSMENT & PLAN NOTE
No clear etiology of syncope. Symptoms improved. Continue present therapy and surveillance for now.  Mild orthostasis on tilt. Discussed orthostatic precautions.

## 2019-07-08 NOTE — PROGRESS NOTES
Subjective:     Encounter Date:07/08/2019    Chief Complaint:    Patient ID: Kim Davison is a 83 y.o. female here today for 3 month cardiac follow-up.    HPI     Loss of Consciousness      Additional comments: no further syncope but has fallen several times due to neuropathy              Palpitations      Additional comments: sometimes if does a lot, feels like it beats fast, improved, no significant arrhythmia on 24 Hr Holter              Hypertension      Additional comments: complains of dizziness, hasn't been checking BP often, doesn't remember numbers, no change in medications, neuropathy discomfort in feet and legs no worse than usual, anxiety controlled with medication          Last edited by Odilia Cline APRN on 7/8/2019 10:20 AM. (History)        History:   Past Medical History:   Diagnosis Date   • Abnormal EKG 2/1/2019   • Atrioventricular block, Mobitz type 1, Wenckebach    • Heart murmur    • Hyperglycemia    • Hypertension    • Insomnia    • Neuropathy    • Orthostasis    • Skin cancer     unknown what type   • SVT (supraventricular tachycardia) (CMS/HCC)    • Syncope      Past Surgical History:   Procedure Laterality Date   • APPENDECTOMY  03/20/2007   • BREAST BIOPSY     • CATARACT EXTRACTION      with lens implants   • HEMORRHOIDECTOMY  03/20/2007   • LUMBAR SPINE SURGERY  2002   • SKIN CANCER EXCISION       Social History     Socioeconomic History   • Marital status:      Spouse name: Not on file   • Number of children: Not on file   • Years of education: Not on file   • Highest education level: Not on file   Tobacco Use   • Smoking status: Current Every Day Smoker     Packs/day: 0.12     Years: 60.00     Pack years: 7.20     Types: Cigarettes     Start date: 1956   • Smokeless tobacco: Never Used   • Tobacco comment: smokes 1-3 cigs per day, less than 1/2 ppd for years - only able to completely quit for 2-3 months at a time, quit several times   Substance and Sexual  Activity   • Alcohol use: No     Frequency: Never   • Drug use: No   • Sexual activity: Defer     Family History   Problem Relation Age of Onset   • Stomach cancer Mother    • Heart attack Father    • Lymphoma Sister    • Heart disease Brother        Outpatient Medications Marked as Taking for the 7/8/19 encounter (Office Visit) with Odilia Cline APRN   Medication Sig Dispense Refill   • ALPRAZolam (XANAX) 0.5 MG tablet Take 0.5 mg by mouth 2 (Two) Times a Day As Needed.     • amLODIPine-benazepril (LOTREL) 10-20 MG per capsule Take 1 capsule by mouth Daily.     • aspirin 81 MG EC tablet Take 81 mg by mouth Daily.     • cholecalciferol (VITAMIN D3) 1000 units tablet Take 1,000 Units by mouth Daily.     • gabapentin (NEURONTIN) 300 MG capsule Take 300 mg by mouth 3 (Three) Times a Day.     • Naproxen Sodium (ALEVE PO) Take 1 tablet by mouth As Needed (takes about once a week for shoulder pain).     • traMADol (ULTRAM) 50 MG tablet Take 50 mg by mouth 3 (Three) Times a Day As Needed.     • vitamin B-12 (CYANOCOBALAMIN) 1000 MCG tablet Take 1,000 mcg by mouth Daily.     • zolpidem CR (AMBIEN CR) 12.5 MG CR tablet Take 12.5 mg by mouth Every Night.       Review of Systems:  Review of Systems   Constitution: Positive for decreased appetite, weakness (for several months, all over - she attributes to her age) and malaise/fatigue. Negative for chills, fever, weight gain and weight loss.   HENT: Negative for congestion and nosebleeds.    Eyes: Negative for blurred vision and double vision.   Cardiovascular: Positive for irregular heartbeat, leg swelling (feet at the end of day  ) and palpitations. Negative for chest pain, dyspnea on exertion, near-syncope and syncope.   Respiratory: Negative for cough, shortness of breath (walking up steps - doesn't have to stop and rest, not exercising regularly ) and wheezing.    Endocrine: Negative for cold intolerance and heat intolerance.   Hematologic/Lymphatic: Bruises/bleeds  "easily.   Skin: Negative for dry skin and rash.   Musculoskeletal: Positive for back pain (cleaning ) and falls. Negative for arthritis, joint pain, joint swelling, muscle cramps, neck pain and stiffness.   Gastrointestinal: Negative for abdominal pain, constipation, diarrhea, heartburn, melena, nausea and vomiting.   Genitourinary: Positive for nocturia (1 time a night ). Negative for hematuria.   Neurological: Positive for dizziness, light-headedness, loss of balance and numbness (tingling L hand if sitting on the couch or lying on her arm). Negative for headaches.   Psychiatric/Behavioral: Positive for depression. The patient has insomnia (on medication ) and is nervous/anxious ( worse in the afternoon, gets \"all keyed\" up - better with anxiety pill).           Objective:   /100 (BP Location: Left arm, Patient Position: Sitting, Cuff Size: Adult)   Pulse 86   Ht 160 cm (63\")   Wt 61.1 kg (134 lb 12.8 oz)   SpO2 96%   BMI 23.88 kg/m²   Wt Readings from Last 3 Encounters:   07/08/19 61.1 kg (134 lb 12.8 oz)   04/08/19 62.1 kg (137 lb)   02/01/19 61.9 kg (136 lb 6.4 oz)       Physical Exam   Constitutional: She is oriented to person, place, and time. She appears well-developed and well-nourished.   Eyes: No scleral icterus.   Neck: Normal carotid pulses and no JVD present. Carotid bruit is not present.   Cardiovascular: Normal rate, regular rhythm, normal heart sounds and intact distal pulses. Exam reveals no gallop and no friction rub.   No murmur heard.  Pulmonary/Chest: Effort normal and breath sounds normal.   Musculoskeletal: She exhibits edema (1+ pitting distal BLEs) and tenderness (BLEs).   Neurological: She is alert and oriented to person, place, and time.   Skin: Skin is warm and dry.   Hemosiderin staining BLEs   Psychiatric: She has a normal mood and affect.   Vitals reviewed.    Lab/Diagnostics Review:  4/24/2019 tilt table test negative tilt table from syncope, orthostatic hemodynamic " [facility]    4/8/2019 14-day extended Holter  · Rare ventricular ectopic beats.  · Rare atrial ectopic beats with runs of nonsustained SVT.  · An episode of Mobitz 1 second degree AV block was noted.  · No symptoms were reported.     2/13/2019  Stress echocardiogram clinically and electrically negative for ischemia, functional capacity is fair, low risk for ischemia   Echocardiogram EF 60-65% grade 1 diastolic dysfunction, mild AI, normal RV size and function, trivial MR, trivial PI.     1/8/2019 24-hour Holter monitor sinus rhythm with rare atrial and ventricular ectopic beats.  No significant pauses, arrhythmias, or events.  No symptoms reported.  A benign monitor study.  As read by Dr. Heath Hull.     7/11/2018 BMP sodium 144 potassium 4.5 chloride 106 CO2 28 creatinine 0.8 BUN 14 calcium 9.6 glucose 119     7/9/2018  CBC WBC 4000, hemoglobin 15, hematocrit 46%, platelets 303,000  Lipid panel total cholesterol 190 triglycerides 112 HDL 61   TSH 1.39 free T4 0.89 free T3 2.67      ECG 12 Lead  Date/Time: 2/1/2019 9:27 AM  Performed by: Odilia Cline APRN  Authorized by: Odilia Cline APRN   Comparison: compared with previous ECG from 7/24/2017  Similar to previous ECG  Comparison to previous ECG: Poor copy quality of previous EKG but no gross changes  Rhythm: sinus rhythm  Rate: normal  BPM: 82  QRS axis: left  Clinical impression: abnormal ECG      ECG 12 Lead  Date/Time: 7/8/2019 10:34 AM  Performed by: Odilia Cline APRN  Authorized by: Odilia Cline APRN   Comparison: compared with previous ECG from 2/1/2019  Comparison to previous ECG: Anterior Q waves no longer present, chronic possible LAE and RVCD and chronic LAD  Rhythm: sinus rhythm  Rate: normal  BPM: 79  QRS axis: left  Other findings: left atrial abnormality    Clinical impression: abnormal EKG                Assessment/Plan:         Problem List Items Addressed This Visit         Cardiovascular and Mediastinum    Syncope (Chronic)    Current Assessment & Plan     No clear etiology of syncope. Symptoms improved. Continue present therapy and surveillance for now.  Mild orthostasis on tilt. Discussed orthostatic precautions.          Relevant Orders    ECG 12 Lead    Palpitations (Chronic)    Current Assessment & Plan     Short runs of SVT on extended Holter. Will avoid beta-blocker for now given intermittent Type 1 2nd degree heart block. Encouraged her to check pulse if feels palpitations. Call if worsens.          Relevant Orders    ECG 12 Lead    Hypertension - Primary (Chronic)    Current Assessment & Plan     Uncontrolled. Add HCTZ and check BMP in 2 weeks. Discussed lifestyle modifications including smoking cessation, routine aerobic exericise, and low sodium diet.          Relevant Medications    hydrochlorothiazide (HYDRODIURIL) 25 MG tablet    Other Relevant Orders    Basic Metabolic Panel    Atrioventricular block, Mobitz type 1, Wenckebach (Chronic)    SVT (supraventricular tachycardia) (CMS/HCC) (Chronic)    Orthostasis (Chronic)        Return in about 3 months (around 10/8/2019) for Recheck.           Odilia Cline, APRN, ACNP-BC, CHFN-BC

## 2019-07-08 NOTE — ASSESSMENT & PLAN NOTE
Short runs of SVT on extended Holter. Will avoid beta-blocker for now given intermittent Type 1 2nd degree heart block. Encouraged her to check pulse if feels palpitations. Call if worsens.

## 2019-07-23 ENCOUNTER — RESULTS ENCOUNTER (OUTPATIENT)
Dept: CARDIOLOGY | Facility: CLINIC | Age: 84
End: 2019-07-23

## 2019-07-23 DIAGNOSIS — I10 ESSENTIAL HYPERTENSION: Chronic | ICD-10-CM

## 2019-10-07 ENCOUNTER — OFFICE VISIT (OUTPATIENT)
Dept: CARDIOLOGY | Facility: CLINIC | Age: 84
End: 2019-10-07

## 2019-10-07 VITALS
WEIGHT: 124.4 LBS | OXYGEN SATURATION: 98 % | BODY MASS INDEX: 22.04 KG/M2 | HEART RATE: 93 BPM | SYSTOLIC BLOOD PRESSURE: 132 MMHG | DIASTOLIC BLOOD PRESSURE: 76 MMHG | HEIGHT: 63 IN

## 2019-10-07 DIAGNOSIS — R63.4 WEIGHT LOSS, NON-INTENTIONAL: ICD-10-CM

## 2019-10-07 DIAGNOSIS — I10 ESSENTIAL HYPERTENSION: Primary | Chronic | ICD-10-CM

## 2019-10-07 DIAGNOSIS — I44.1 ATRIOVENTRICULAR BLOCK, MOBITZ TYPE 1, WENCKEBACH: Chronic | ICD-10-CM

## 2019-10-07 DIAGNOSIS — I47.1 SVT (SUPRAVENTRICULAR TACHYCARDIA) (HCC): Chronic | ICD-10-CM

## 2019-10-07 PROBLEM — R55 SYNCOPE: Chronic | Status: RESOLVED | Noted: 2019-02-01 | Resolved: 2019-10-07

## 2019-10-07 PROCEDURE — 99214 OFFICE O/P EST MOD 30 MIN: CPT | Performed by: NURSE PRACTITIONER

## 2019-10-07 RX ORDER — ERGOCALCIFEROL 1.25 MG/1
50000 CAPSULE ORAL WEEKLY
COMMUNITY

## 2019-10-07 NOTE — PROGRESS NOTES
Subjective:     Encounter Date:10/07/2019    Chief Complaint:    Patient ID: Kim Davison is a 83 y.o. female here today for 3 month cardiac follow-up.    HPI     Hypertension      Additional comments: having trouble with losing her balance, has lost 7 lbs unintentionally - no appetite, hasn't been checking BP regularly. No further syncope or falls from neuropathy. Walks carefully but not using a cane or walker.              Palpitations      Additional comments: sometimes feels it beat fast if does a lot of housework and then notices when lies down on the couch          Last edited by Odilia Cline APRN on 10/7/2019  9:53 AM. (History)        History:   Past Medical History:   Diagnosis Date   • Abnormal EKG 2/1/2019   • Atrioventricular block, Mobitz type 1, Wenckebach    • Heart murmur    • Hyperglycemia    • Hypertension    • Insomnia    • Neuropathy    • Orthostasis    • Skin cancer     unknown what type   • SVT (supraventricular tachycardia) (CMS/HCC)    • Syncope      Past Surgical History:   Procedure Laterality Date   • APPENDECTOMY  03/20/2007   • BREAST BIOPSY     • CATARACT EXTRACTION      with lens implants   • HEMORRHOIDECTOMY  03/20/2007   • LUMBAR SPINE SURGERY  2002   • SKIN CANCER EXCISION       Social History     Socioeconomic History   • Marital status:      Spouse name: Not on file   • Number of children: Not on file   • Years of education: Not on file   • Highest education level: Not on file   Tobacco Use   • Smoking status: Current Every Day Smoker     Packs/day: 0.12     Years: 60.00     Pack years: 7.20     Types: Cigarettes     Start date: 1956   • Smokeless tobacco: Never Used   • Tobacco comment: smokes 1-3 cigs per day, less than 1/2 ppd for years - only able to completely quit for 2-3 months at a time, quit several times   Substance and Sexual Activity   • Alcohol use: No     Frequency: Never   • Drug use: No   • Sexual activity: Defer     Family History   Problem  Relation Age of Onset   • Stomach cancer Mother    • Heart attack Father    • Lymphoma Sister    • Heart disease Brother        Outpatient Medications Marked as Taking for the 10/7/19 encounter (Office Visit) with Odilia Cline APRN   Medication Sig Dispense Refill   • ALPRAZolam (XANAX) 0.5 MG tablet Take 0.5 mg by mouth 2 (Two) Times a Day As Needed.     • amLODIPine-benazepril (LOTREL) 10-20 MG per capsule Take 1 capsule by mouth Daily.     • gabapentin (NEURONTIN) 300 MG capsule Take 300 mg by mouth 3 (Three) Times a Day.     • hydrochlorothiazide (HYDRODIURIL) 25 MG tablet Take 1 tablet by mouth Daily. 30 tablet 11   • traMADol (ULTRAM) 50 MG tablet Take 50 mg by mouth 3 (Three) Times a Day As Needed.     • vitamin B-12 (CYANOCOBALAMIN) 1000 MCG tablet Take 1,000 mcg by mouth Daily.     • vitamin D (ERGOCALCIFEROL) 03713 units capsule capsule Take 50,000 Units by mouth 1 (One) Time Per Week.     • zolpidem CR (AMBIEN CR) 12.5 MG CR tablet Take 12.5 mg by mouth Every Night.     • [DISCONTINUED] Naproxen Sodium (ALEVE PO) Take 1 tablet by mouth As Needed (takes about once a week for shoulder pain).       Review of Systems:  Review of Systems   Constitution: Positive for decreased appetite, weakness (for several months, all over - she attributes to her age - about the same ), malaise/fatigue and weight loss (7 lbs in the last few months (10 lbs in last 3 months) ). Negative for chills, fever and weight gain.   HENT: Negative for congestion and nosebleeds.    Eyes: Negative for blurred vision and double vision.   Cardiovascular: Positive for irregular heartbeat and palpitations. Negative for chest pain, dyspnea on exertion, leg swelling (feet had been at the end of day but none since adding HCTZ 7/2019), near-syncope and syncope.   Respiratory: Negative for cough, shortness of breath (walking up steps - doesn't have to stop and rest, not exercising regularly ) and wheezing.    Endocrine: Negative for cold  "intolerance and heat intolerance.   Hematologic/Lymphatic: Bruises/bleeds easily.   Skin: Negative for dry skin and rash.   Musculoskeletal: Positive for arthritis. Negative for back pain (cleaning ), falls, joint pain, joint swelling, muscle cramps, neck pain and stiffness.   Gastrointestinal: Negative for abdominal pain, constipation, diarrhea, heartburn, melena, nausea and vomiting.   Genitourinary: Positive for nocturia (1 time a night ). Negative for hematuria.   Neurological: Positive for loss of balance and numbness (sometimes tingling L hand if sitting on the couch or lying on her arm ). Negative for dizziness, headaches and light-headedness.   Psychiatric/Behavioral: Positive for depression. The patient has insomnia (improved with Ambien) and is nervous/anxious ( worse in the afternoon, gets \"all keyed\" up - better with anxiety pill).         Objective:   /76 (BP Location: Left arm, Patient Position: Sitting, Cuff Size: Adult)   Pulse 93   Ht 160 cm (63\")   Wt 56.4 kg (124 lb 6.4 oz)   SpO2 98%   BMI 22.04 kg/m²   Wt Readings from Last 3 Encounters:   10/07/19 56.4 kg (124 lb 6.4 oz)   07/08/19 61.1 kg (134 lb 12.8 oz)   04/08/19 62.1 kg (137 lb)       Physical Exam   Constitutional: She is oriented to person, place, and time. She appears well-developed and well-nourished.   HENT:   Right Ear: Decreased hearing is noted.   Left Ear: Decreased hearing is noted.   Eyes: No scleral icterus.   Neck: No JVD present.   Cardiovascular: Normal rate, regular rhythm and intact distal pulses. Exam reveals no gallop and no friction rub.   Murmur heard.   Systolic murmur is present with a grade of 1/6.  Pulmonary/Chest: Effort normal and breath sounds normal.   Musculoskeletal: She exhibits no edema.   Neurological: She is alert and oriented to person, place, and time.   Skin: Skin is warm and dry.   Psychiatric: She has a normal mood and affect.   Vitals reviewed.    Lab/Diagnostics Review:   4/24/2019 tilt " table test negative tilt table from syncope, orthostatic hemodynamic response to upright [facility]     4/8/2019 14-day extended Holter  · Rare ventricular ectopic beats.  · Rare atrial ectopic beats with runs of nonsustained SVT.  · An episode of Mobitz 1 second degree AV block was noted.  · No symptoms were reported.     2/13/2019  Stress echocardiogram clinically and electrically negative for ischemia, functional capacity is fair, low risk for ischemia   Echocardiogram EF 60-65% grade 1 diastolic dysfunction, mild AI, normal RV size and function, trivial MR, trivial PI.     1/8/2019 24-hour Holter monitor sinus rhythm with rare atrial and ventricular ectopic beats.  No significant pauses, arrhythmias, or events.  No symptoms reported.  A benign monitor study.  As read by Dr. Heath Hull.     7/11/2018 BMP sodium 144 potassium 4.5 chloride 106 CO2 28 creatinine 0.8 BUN 14 calcium 9.6 glucose 119     7/9/2018  CBC WBC 4000, hemoglobin 15, hematocrit 46%, platelets 303,000  Lipid panel total cholesterol 190 triglycerides 112 HDL 61   TSH 1.39 free T4 0.89 free T3 2.67      ECG 12 Lead  Date/Time: 2/1/2019 9:27 AM  Performed by: Odilia Cline APRN  Authorized by: Odilia Cline APRN   Comparison: compared with previous ECG from 7/24/2017  Similar to previous ECG  Comparison to previous ECG: Poor copy quality of previous EKG but no gross changes  Rhythm: sinus rhythm  Rate: normal  BPM: 82  QRS axis: left  Clinical impression: abnormal ECG      ECG 12 Lead  Date/Time: 7/8/2019 10:34 AM  Performed by: Odilia Cline APRN  Authorized by: Odilia Cline APRN   Comparison: compared with previous ECG from 2/1/2019  Comparison to previous ECG: Anterior Q waves no longer present, chronic possible LAE and RVCD and chronic LAD  Rhythm: sinus rhythm  Rate: normal  BPM: 79  QRS axis: left  Other findings: left atrial abnormality    Clinical impression: abnormal EKG      Procedures: none  in office today        Assessment/Plan:         Problem List Items Addressed This Visit        Cardiovascular and Mediastinum    Hypertension - Primary (Chronic)    Current Assessment & Plan     Much better controlled since addition of HCTZ. Continue present therapy.         Atrioventricular block, Mobitz type 1, Wenckebach (Chronic)    Current Assessment & Plan     Stable, Continue present therapy. Avoid AV sea/rate slowing medications.         SVT (supraventricular tachycardia) (CMS/HCC) (Chronic)    Current Assessment & Plan     Relatively asymptomatic. Suspect fast heart beats when she lies down are sinus tachycardia since not symptomatic with exertion. Only short runs on Holter. Avoid rate slowing meds due to AVB.             Other    Weight loss, non-intentional    Current Assessment & Plan     Encouraged her to increase healthy calories and discuss with Winifred Kramer NP at upcoming visit. May need to check TSH if not done since 7/2018. May need appetite stimulant.             Return in about 6 months (around 4/7/2020) for Recheck.           Odilia Cline, APRN, ACNP-BC, CHFN-BC

## 2019-10-07 NOTE — ASSESSMENT & PLAN NOTE
Relatively asymptomatic. Suspect fast heart beats when she lies down are sinus tachycardia since not symptomatic with exertion. Only short runs on Holter. Avoid rate slowing meds due to AVB.

## 2019-10-07 NOTE — ASSESSMENT & PLAN NOTE
Encouraged her to increase healthy calories and discuss with Winifred Kramer NP at upcoming visit. May need to check TSH if not done since 7/2018. May need appetite stimulant.

## 2020-04-13 ENCOUNTER — OFFICE VISIT (OUTPATIENT)
Dept: CARDIOLOGY | Facility: CLINIC | Age: 85
End: 2020-04-13

## 2020-04-13 VITALS
BODY MASS INDEX: 23.74 KG/M2 | WEIGHT: 134 LBS | SYSTOLIC BLOOD PRESSURE: 140 MMHG | HEIGHT: 63 IN | DIASTOLIC BLOOD PRESSURE: 68 MMHG

## 2020-04-13 DIAGNOSIS — I10 ESSENTIAL HYPERTENSION: Chronic | ICD-10-CM

## 2020-04-13 DIAGNOSIS — I47.1 SVT (SUPRAVENTRICULAR TACHYCARDIA) (HCC): Primary | Chronic | ICD-10-CM

## 2020-04-13 DIAGNOSIS — I44.1 ATRIOVENTRICULAR BLOCK, MOBITZ TYPE 1, WENCKEBACH: Chronic | ICD-10-CM

## 2020-04-13 PROCEDURE — 99214 OFFICE O/P EST MOD 30 MIN: CPT | Performed by: NURSE PRACTITIONER

## 2020-04-13 NOTE — PROGRESS NOTES
"    Subjective:     Encounter Date:04/13/2020    Chief Complaint: HTN, palpitations      Patient ID: Kim Davison is a 84 y.o. female seen today by telephone due to COVID-19 for overdue 3 month cardiac follow-up.    She hasn't been checking her BP regularly. She attributes some BP elevation to chronic pain from neuropathy. Her son was recently diagnosed with lung cancer which makes her more nervous and depressed. She denies any palpitations, slow heart rates, near syncope, or syncope. She reports her weakness has improved a little now that appetite has improved and has gained back 10 lbs that she lost last year. She is not exercising regularly but is able to do all of her housework.    Hypertension   This is a chronic problem. The current episode started more than 1 year ago. The problem is unchanged. Condition status: variable. Associated symptoms include anxiety, malaise/fatigue (improved since gaining some weight back and eating better) and peripheral edema (ankles by the end of the day). Pertinent negatives include no blurred vision, chest pain, headaches, neck pain, orthopnea, palpitations, PND or shortness of breath (walking up steps - doesn't have to stop and rest, not exercising regularly ). There are no associated agents to hypertension. Risk factors for coronary artery disease include post-menopausal state, sedentary lifestyle, smoking/tobacco exposure and stress. Past treatments include calcium channel blockers, ACE inhibitors and diuretics. Current antihypertension treatment includes diuretics, calcium channel blockers and ACE inhibitors. The current treatment provides moderate improvement. Compliance problems include exercise.  There is no history of CAD/MI, heart failure or left ventricular hypertrophy. There is no history of a thyroid problem.   Palpitations    This is a chronic problem. Associated symptoms include anxiety ( worse in the afternoon, gets \"all keyed\" up - better with anxiety pill), " malaise/fatigue (improved since gaining some weight back and eating better), numbness (sometimes tingling L hand if sitting on the couch or lying on her arm) and weakness (a little improved with weight gain and improved appetite). Pertinent negatives include no chest pain, coughing, dizziness, fever, irregular heartbeat, nausea, near-syncope, shortness of breath (walking up steps - doesn't have to stop and rest, not exercising regularly ), syncope or vomiting.       History:   Past Medical History:   Diagnosis Date   • Abnormal EKG 2/1/2019   • Atrioventricular block, Mobitz type 1, Wenckebach    • Heart murmur    • Hyperglycemia    • Hypertension    • Insomnia    • Neuropathy    • Orthostasis    • Skin cancer     unknown what type   • SVT (supraventricular tachycardia) (CMS/HCC)    • Syncope      Past Surgical History:   Procedure Laterality Date   • APPENDECTOMY  03/20/2007   • BREAST BIOPSY     • CATARACT EXTRACTION      with lens implants   • HEMORRHOIDECTOMY  03/20/2007   • LUMBAR SPINE SURGERY  2002   • SKIN CANCER EXCISION       Social History     Socioeconomic History   • Marital status:      Spouse name: Not on file   • Number of children: Not on file   • Years of education: Not on file   • Highest education level: Not on file   Tobacco Use   • Smoking status: Current Every Day Smoker     Packs/day: 0.25     Years: 60.00     Pack years: 15.00     Types: Cigarettes     Start date: 1956   • Smokeless tobacco: Never Used   • Tobacco comment: smoking 4-5 cigs per day (had been 1-3 per day at last visit), smoked less than 1/2 ppd for years - only able to completely quit for 2-3 months at a time, quit several times   Substance and Sexual Activity   • Alcohol use: No     Frequency: Never   • Drug use: No   • Sexual activity: Defer     Family History   Problem Relation Age of Onset   • Stomach cancer Mother    • Heart attack Father    • Lymphoma Sister    • Heart disease Brother    • Lung cancer Son           Outpatient Medications Marked as Taking for the 4/13/20 encounter (Office Visit) with Odilia Cline APRN   Medication Sig Dispense Refill   • ALPRAZolam (XANAX) 0.5 MG tablet Take 0.5 mg by mouth 2 (Two) Times a Day As Needed.     • amLODIPine-benazepril (LOTREL) 10-20 MG per capsule Take 1 capsule by mouth Daily.     • gabapentin (NEURONTIN) 300 MG capsule Take 300 mg by mouth 3 (Three) Times a Day.     • hydrochlorothiazide (HYDRODIURIL) 25 MG tablet Take 1 tablet by mouth Daily. 30 tablet 11   • traMADol (ULTRAM) 50 MG tablet Take 50 mg by mouth 3 (Three) Times a Day As Needed.     • vitamin D (ERGOCALCIFEROL) 28982 units capsule capsule Take 50,000 Units by mouth 1 (One) Time Per Week.     • zolpidem CR (AMBIEN CR) 12.5 MG CR tablet Take 12.5 mg by mouth Every Night.       Review of Systems:  Review of Systems   Constitution: Positive for malaise/fatigue (improved since gaining some weight back and eating better) and weight gain (intentional since appetite has improved, had lost weight unintentionally with loss of appetite in 2019). Negative for chills, decreased appetite, fever and weight loss.   HENT: Negative for congestion and nosebleeds.    Eyes: Negative for blurred vision and double vision.   Cardiovascular: Positive for leg swelling. Negative for chest pain, dyspnea on exertion, irregular heartbeat, near-syncope, orthopnea, palpitations, paroxysmal nocturnal dyspnea and syncope.   Respiratory: Negative for cough, shortness of breath (walking up steps - doesn't have to stop and rest, not exercising regularly ) and wheezing.    Endocrine: Negative for cold intolerance and heat intolerance.   Hematologic/Lymphatic: Bruises/bleeds easily.   Skin: Negative for dry skin and rash.   Musculoskeletal: Positive for arthritis, back pain and muscle cramps. Negative for falls, joint pain, joint swelling, neck pain and stiffness.   Gastrointestinal: Negative for abdominal pain, constipation, diarrhea,  "heartburn, melena, nausea and vomiting.   Genitourinary: Positive for nocturia (1 time a night ). Negative for hematuria.   Neurological: Positive for loss of balance (she attributes to neuropathy), numbness (sometimes tingling L hand if sitting on the couch or lying on her arm), paresthesias (lower extremities from neuropathy) and weakness (a little improved with weight gain and improved appetite). Negative for dizziness, headaches and light-headedness.   Psychiatric/Behavioral: Positive for depression. The patient has insomnia (improved with Ambien) and is nervous/anxious ( worse in the afternoon, gets \"all keyed\" up - better with anxiety pill).         Objective:   /68 (BP Location: Left arm, Patient Position: Sitting, Cuff Size: Adult)   Ht 160 cm (63\")   Wt 60.8 kg (134 lb)   BMI 23.74 kg/m²   Wt Readings from Last 3 Encounters:   04/13/20 60.8 kg (134 lb)   10/07/19 56.4 kg (124 lb 6.4 oz)   07/08/19 61.1 kg (134 lb 12.8 oz)       Physical Exam   Constitutional: She is cooperative.   Pulmonary/Chest: No tachypnea and no bradypnea. No respiratory distress.   Neurological: She is alert.   Psychiatric: Her speech is normal. Thought content normal. Her mood appears not anxious. Cognition and memory are normal.     Lab/Diagnostics Review:   7/22/2019 [facility]  CBC WBC 8900 hemoglobin 15.4 hematocrit 45.7% platelets 413,000  CMP sodium 137 potassium 4.3 chloride 96 CO2 30 BUN 18 creatinine 0.9 calcium 9.8 glucose 106 total protein 6.8 albumin 4.7 alk phos 117 AST 13 ALT 15 total bilirubin 0.4  Lipid panel total cholesterol 183 triglycerides 82 HDL 73 LDL 94  Vitamin D 27.5    4/24/2019 tilt table test negative tilt table from syncope, orthostatic hemodynamic response to upright tilt, Dr. Della JULIAN     4/8/2019 14-day extended Holter  · Rare ventricular ectopic beats.  · Rare atrial ectopic beats with runs of nonsustained SVT.  · An episode of Mobitz 1 second degree AV block was noted.  · No symptoms " were reported.     2/13/2019  Stress echocardiogram clinically and electrically negative for ischemia, functional capacity is fair, low risk for ischemia   Echocardiogram EF 60-65% grade 1 diastolic dysfunction, mild AI, normal RV size and function, trivial MR, trivial PI.     1/8/2019 24-hour Holter monitor sinus rhythm with rare atrial and ventricular ectopic beats.  No significant pauses, arrhythmias, or events.  No symptoms reported.  A benign monitor study.  As read by Dr. Heath Hull.     7/11/2018 BMP sodium 144 potassium 4.5 chloride 106 CO2 28 creatinine 0.8 BUN 14 calcium 9.6 glucose 119     7/9/2018  CBC WBC 4000, hemoglobin 15, hematocrit 46%, platelets 303,000  Lipid panel total cholesterol 190 triglycerides 112 HDL 61   TSH 1.39 free T4 0.89 free T3 2.67      ECG 12 Lead  Date/Time: 2/1/2019 9:27 AM  Performed by: Odilia Cline APRN  Authorized by: Odilia Cline APRN   Comparison: compared with previous ECG from 7/24/2017  Similar to previous ECG  Comparison to previous ECG: Poor copy quality of previous EKG but no gross changes  Rhythm: sinus rhythm  Rate: normal  BPM: 82  QRS axis: left  Clinical impression: abnormal ECG      ECG 12 Lead  Date/Time: 7/8/2019 10:34 AM  Performed by: Odliia Cline APRN  Authorized by: Odilia Cline APRN   Comparison: compared with previous ECG from 2/1/2019  Comparison to previous ECG: Anterior Q waves no longer present, chronic possible LAE and RVCD and chronic LAD  Rhythm: sinus rhythm  Rate: normal  BPM: 79  QRS axis: left  Other findings: left atrial abnormality  Clinical impression: abnormal EKG         Assessment/Plan:       Problem List Items Addressed This Visit        Cardiovascular and Mediastinum    Atrioventricular block, Mobitz type 1, Wenckebach (Chronic)    Current Assessment & Plan     Stable, Continue present therapy. Avoid AV sea/rate slowing medications.         Hypertension (Chronic)    Current Assessment &  Plan     Had been much better controlled since addition of HCTZ but elevated today. Continue present therapy. Encouraged her to keep BP log for 1-2 weeks and call with readings (especially if consistently greater than 130/80).         SVT (supraventricular tachycardia) (CMS/HCC) - Primary (Chronic)    Current Assessment & Plan     Remains relatively asymptomatic. Only short runs on Holter. Avoid rate slowing meds due to AVB.              Encouraged social distancing and good handwashing. Will mail Stop Germs! Wash Your Hands and Protect Yourself and Others from Coronavirus (COVID-19) handouts. Reviewed symptoms and when to call PCP vs go to ER.     A phone visit was used to complete this visit. Total time of discussion was 12 minutes.    Return in about 6 months (around 10/13/2020) for Recheck.         RAVEN Rose, ACNP-BC, CHFN-BC

## 2020-04-13 NOTE — ASSESSMENT & PLAN NOTE
Had been much better controlled since addition of HCTZ but elevated today. Continue present therapy. Encouraged her to keep BP log for 1-2 weeks and call with readings (especially if consistently greater than 130/80).

## 2020-04-13 NOTE — PATIENT INSTRUCTIONS
Hand Washing  Germs such as bacteria, viruses, and parasites are found everywhere. They can be in the air and water. They can also be on surfaces like food, door handles, and your skin. Every day, your hands touch germs. Many of these germs can make you and your family sick. Washing your hands is one of the best ways to lower your risk of getting and sharing germs.  When should I wash my hands?  You should wash your hands whenever you think they are dirty. You should also wash your hands:  · Before:  ? Visiting a baby or anyone with a weakened disease-fighting system (immunesystem).  ? Putting in and taking out contact lenses.  · After:  ? Using the bathroom or helping someone else use the bathroom.  ? Working or playing outside.  ? Touching or taking out the garbage.  ? Touching anything dirty around your home.  ? Sneezing, coughing, or blowing your nose.  ? Using a phone, including your mobile phone.  ? Touching an animal, animal food, animal poop, or its toys or leash.  ? Touching money.  ? Using household  or poisonous chemicals.  ? Handling dirty clothes, bedding, or rags.  ? Using public transportation.  ? Going shopping, especially if you use a shopping cart or basket.  ? Shaking hands.  ? Handling livestock, such as cows or sheep.  · Before and after:  ? Preparing food.  ? Eating.  ? Visiting or taking care of someone who is sick. This includes touching used tissues, toys, and clothes.  ? Changing a bandage (dressing).  ? Taking care of an injury or wound.  ? Giving or taking medicine.  ? Preparing a bottle for a baby.  ? Feeding a baby or young child.  ? Changing a diaper.  What is the right way to wash my hands?    1. Wet your hands with clean, running water. Turn off the water or move your hands out of the running water.  2. Apply liquid soap or bar soap to your hands.  3. Rub your hands together quickly to create lather.  4. Keep rubbing your hands together for at least 20 seconds. Thoroughly  scrub all parts of your hands. This includes scrubbing under your fingernails and between your fingers.  5. Rinse your hands with clean, running water. Do this until all the soap is gone.  6. Dry your hands using an air dryer or a clean paper or cloth towel, or let your hands air-dry. Do not use your clothing or a dirty towel to dry your hands.  If you are in a public restroom, use your towel:  · To turn off the water faucet.  · To open the bathroom door.  How can I clean my hands if I do not have soap and water?    If soap and clean water are not available, use a hand-washing wipe, spray, or gel (hand ). Use one that contains at least 60% alcohol. If you are handling food, gels are not recommended as a replacement for hand washing with soap and water.  To use these products, follow the directions on the product, and:  · Apply enough product to cover your hands.  · Make sure you wipe, rub, or spray the product so that it reaches every part of your hands and wrists. Include the backs of your hands, between your fingers, and under your fingernails.  · Rub the product onto your hands until it dries.  Summary  · Every day, your hands touch germs. Many of these germs can make you and your family sick.  · Washing your hands is one of the best ways to lower your risk of getting and sharing germs.  · If soap and clean water are not available, use a hand-washing wipe, spray, or gel.  This information is not intended to replace advice given to you by your health care provider. Make sure you discuss any questions you have with your health care provider.  Document Released: 11/30/2009 Document Revised: 09/26/2018 Document Reviewed: 09/26/2018  [employer] Interactive Patient Education © 2020 [employer]

## 2021-03-21 ENCOUNTER — APPOINTMENT (OUTPATIENT)
Dept: CT IMAGING | Facility: HOSPITAL | Age: 86
End: 2021-03-21

## 2021-03-21 ENCOUNTER — APPOINTMENT (OUTPATIENT)
Dept: GENERAL RADIOLOGY | Facility: HOSPITAL | Age: 86
End: 2021-03-21

## 2021-03-21 ENCOUNTER — HOSPITAL ENCOUNTER (EMERGENCY)
Facility: HOSPITAL | Age: 86
Discharge: HOME | End: 2021-03-21
Admitting: EMERGENCY MEDICINE

## 2021-03-21 VITALS
BODY MASS INDEX: 20.73 KG/M2 | WEIGHT: 117 LBS | SYSTOLIC BLOOD PRESSURE: 168 MMHG | HEIGHT: 63 IN | HEART RATE: 80 BPM | DIASTOLIC BLOOD PRESSURE: 80 MMHG | RESPIRATION RATE: 20 BRPM | TEMPERATURE: 98.5 F | OXYGEN SATURATION: 98 %

## 2021-03-21 DIAGNOSIS — R10.11 RIGHT UPPER QUADRANT ABDOMINAL PAIN: Primary | ICD-10-CM

## 2021-03-21 LAB
ALBUMIN SERPL-MCNC: 4 G/DL (ref 3.5–5.2)
ALBUMIN/GLOB SERPL: 1.7 G/DL
ALP SERPL-CCNC: 124 U/L (ref 39–117)
ALT SERPL W P-5'-P-CCNC: 10 U/L (ref 1–33)
ANION GAP SERPL CALCULATED.3IONS-SCNC: 9 MMOL/L (ref 5–15)
APTT PPP: 24.4 SECONDS (ref 24.1–35)
AST SERPL-CCNC: 11 U/L (ref 1–32)
BACTERIA UR QL AUTO: ABNORMAL /HPF
BASOPHILS # BLD AUTO: 0.07 10*3/MM3 (ref 0–0.2)
BASOPHILS NFR BLD AUTO: 0.9 % (ref 0–1.5)
BILIRUB SERPL-MCNC: 0.4 MG/DL (ref 0–1.2)
BILIRUB UR QL STRIP: NEGATIVE
BUN SERPL-MCNC: 21 MG/DL (ref 8–23)
BUN/CREAT SERPL: 33.9 (ref 7–25)
CALCIUM SPEC-SCNC: 9.2 MG/DL (ref 8.6–10.5)
CHLORIDE SERPL-SCNC: 107 MMOL/L (ref 98–107)
CLARITY UR: CLEAR
CO2 SERPL-SCNC: 26 MMOL/L (ref 22–29)
COLOR UR: YELLOW
CREAT SERPL-MCNC: 0.62 MG/DL (ref 0.57–1)
D-LACTATE SERPL-SCNC: 0.8 MMOL/L (ref 0.5–2)
DEPRECATED RDW RBC AUTO: 52.8 FL (ref 37–54)
EOSINOPHIL # BLD AUTO: 0.07 10*3/MM3 (ref 0–0.4)
EOSINOPHIL NFR BLD AUTO: 0.9 % (ref 0.3–6.2)
ERYTHROCYTE [DISTWIDTH] IN BLOOD BY AUTOMATED COUNT: 15.3 % (ref 12.3–15.4)
GFR SERPL CREATININE-BSD FRML MDRD: 92 ML/MIN/1.73
GLOBULIN UR ELPH-MCNC: 2.3 GM/DL
GLUCOSE SERPL-MCNC: 97 MG/DL (ref 65–99)
GLUCOSE UR STRIP-MCNC: NEGATIVE MG/DL
HCT VFR BLD AUTO: 39.9 % (ref 34–46.6)
HGB BLD-MCNC: 13.5 G/DL (ref 12–15.9)
HGB UR QL STRIP.AUTO: NEGATIVE
HOLD SPECIMEN: NORMAL
HYALINE CASTS UR QL AUTO: ABNORMAL /LPF
IMM GRANULOCYTES # BLD AUTO: 0.08 10*3/MM3 (ref 0–0.05)
IMM GRANULOCYTES NFR BLD AUTO: 1 % (ref 0–0.5)
INR PPP: 0.96 (ref 0.91–1.09)
KETONES UR QL STRIP: NEGATIVE
LEUKOCYTE ESTERASE UR QL STRIP.AUTO: ABNORMAL
LIPASE SERPL-CCNC: 34 U/L (ref 13–60)
LYMPHOCYTES # BLD AUTO: 1.42 10*3/MM3 (ref 0.7–3.1)
LYMPHOCYTES NFR BLD AUTO: 17.8 % (ref 19.6–45.3)
MCH RBC QN AUTO: 31.8 PG (ref 26.6–33)
MCHC RBC AUTO-ENTMCNC: 33.8 G/DL (ref 31.5–35.7)
MCV RBC AUTO: 93.9 FL (ref 79–97)
MONOCYTES # BLD AUTO: 0.65 10*3/MM3 (ref 0.1–0.9)
MONOCYTES NFR BLD AUTO: 8.2 % (ref 5–12)
NEUTROPHILS NFR BLD AUTO: 5.67 10*3/MM3 (ref 1.7–7)
NEUTROPHILS NFR BLD AUTO: 71.2 % (ref 42.7–76)
NITRITE UR QL STRIP: NEGATIVE
NRBC BLD AUTO-RTO: 0 /100 WBC (ref 0–0.2)
PH UR STRIP.AUTO: 6 [PH] (ref 5–8)
PLATELET # BLD AUTO: 367 10*3/MM3 (ref 140–450)
PMV BLD AUTO: 8.7 FL (ref 6–12)
POTASSIUM SERPL-SCNC: 3.9 MMOL/L (ref 3.5–5.2)
PROT SERPL-MCNC: 6.3 G/DL (ref 6–8.5)
PROT UR QL STRIP: NEGATIVE
PROTHROMBIN TIME: 12.4 SECONDS (ref 11.9–14.6)
RBC # BLD AUTO: 4.25 10*6/MM3 (ref 3.77–5.28)
RBC # UR: ABNORMAL /HPF
REF LAB TEST METHOD: ABNORMAL
SARS-COV-2 RNA PNL SPEC NAA+PROBE: NOT DETECTED
SODIUM SERPL-SCNC: 142 MMOL/L (ref 136–145)
SP GR UR STRIP: 1.01 (ref 1–1.03)
SQUAMOUS #/AREA URNS HPF: ABNORMAL /HPF
TROPONIN T SERPL-MCNC: <0.01 NG/ML (ref 0–0.03)
UROBILINOGEN UR QL STRIP: ABNORMAL
WBC # BLD AUTO: 7.96 10*3/MM3 (ref 3.4–10.8)
WBC UR QL AUTO: ABNORMAL /HPF
WHOLE BLOOD HOLD SPECIMEN: NORMAL
WHOLE BLOOD HOLD SPECIMEN: NORMAL

## 2021-03-21 PROCEDURE — 83605 ASSAY OF LACTIC ACID: CPT | Performed by: NURSE PRACTITIONER

## 2021-03-21 PROCEDURE — 81001 URINALYSIS AUTO W/SCOPE: CPT | Performed by: NURSE PRACTITIONER

## 2021-03-21 PROCEDURE — 96376 TX/PRO/DX INJ SAME DRUG ADON: CPT

## 2021-03-21 PROCEDURE — 71045 X-RAY EXAM CHEST 1 VIEW: CPT

## 2021-03-21 PROCEDURE — 74177 CT ABD & PELVIS W/CONTRAST: CPT

## 2021-03-21 PROCEDURE — 93005 ELECTROCARDIOGRAM TRACING: CPT | Performed by: EMERGENCY MEDICINE

## 2021-03-21 PROCEDURE — 99284 EMERGENCY DEPT VISIT MOD MDM: CPT

## 2021-03-21 PROCEDURE — 85025 COMPLETE CBC W/AUTO DIFF WBC: CPT | Performed by: NURSE PRACTITIONER

## 2021-03-21 PROCEDURE — 96374 THER/PROPH/DIAG INJ IV PUSH: CPT

## 2021-03-21 PROCEDURE — 87635 SARS-COV-2 COVID-19 AMP PRB: CPT | Performed by: NURSE PRACTITIONER

## 2021-03-21 PROCEDURE — 25010000002 ONDANSETRON PER 1 MG: Performed by: NURSE PRACTITIONER

## 2021-03-21 PROCEDURE — 85730 THROMBOPLASTIN TIME PARTIAL: CPT | Performed by: NURSE PRACTITIONER

## 2021-03-21 PROCEDURE — 84484 ASSAY OF TROPONIN QUANT: CPT | Performed by: NURSE PRACTITIONER

## 2021-03-21 PROCEDURE — 25010000002 IOPAMIDOL 61 % SOLUTION: Performed by: NURSE PRACTITIONER

## 2021-03-21 PROCEDURE — 83690 ASSAY OF LIPASE: CPT | Performed by: NURSE PRACTITIONER

## 2021-03-21 PROCEDURE — 25010000002 MORPHINE SULFATE (PF) 2 MG/ML SOLUTION: Performed by: NURSE PRACTITIONER

## 2021-03-21 PROCEDURE — 80053 COMPREHEN METABOLIC PANEL: CPT | Performed by: NURSE PRACTITIONER

## 2021-03-21 PROCEDURE — 96375 TX/PRO/DX INJ NEW DRUG ADDON: CPT

## 2021-03-21 PROCEDURE — 85610 PROTHROMBIN TIME: CPT | Performed by: NURSE PRACTITIONER

## 2021-03-21 PROCEDURE — 93010 ELECTROCARDIOGRAM REPORT: CPT | Performed by: INTERNAL MEDICINE

## 2021-03-21 PROCEDURE — 87040 BLOOD CULTURE FOR BACTERIA: CPT | Performed by: NURSE PRACTITIONER

## 2021-03-21 RX ORDER — ONDANSETRON 4 MG/1
4 TABLET, ORALLY DISINTEGRATING ORAL EVERY 6 HOURS PRN
Qty: 12 TABLET | Refills: 0 | Status: SHIPPED | OUTPATIENT
Start: 2021-03-21 | End: 2021-03-24

## 2021-03-21 RX ORDER — MORPHINE SULFATE 2 MG/ML
2 INJECTION, SOLUTION INTRAMUSCULAR; INTRAVENOUS ONCE
Status: COMPLETED | OUTPATIENT
Start: 2021-03-21 | End: 2021-03-21

## 2021-03-21 RX ORDER — ONDANSETRON 2 MG/ML
4 INJECTION INTRAMUSCULAR; INTRAVENOUS ONCE
Status: COMPLETED | OUTPATIENT
Start: 2021-03-21 | End: 2021-03-21

## 2021-03-21 RX ORDER — SODIUM CHLORIDE 0.9 % (FLUSH) 0.9 %
10 SYRINGE (ML) INJECTION AS NEEDED
Status: DISCONTINUED | OUTPATIENT
Start: 2021-03-21 | End: 2021-03-21 | Stop reason: HOSPADM

## 2021-03-21 RX ORDER — OMEPRAZOLE 20 MG/1
20 CAPSULE, DELAYED RELEASE ORAL DAILY
Qty: 14 CAPSULE | Refills: 0 | Status: SHIPPED | OUTPATIENT
Start: 2021-03-21 | End: 2021-04-04

## 2021-03-21 RX ADMIN — SODIUM CHLORIDE 500 ML: 9 INJECTION, SOLUTION INTRAVENOUS at 13:29

## 2021-03-21 RX ADMIN — ONDANSETRON HYDROCHLORIDE 4 MG: 2 SOLUTION INTRAMUSCULAR; INTRAVENOUS at 16:20

## 2021-03-21 RX ADMIN — IOPAMIDOL 100 ML: 612 INJECTION, SOLUTION INTRAVENOUS at 14:58

## 2021-03-21 RX ADMIN — MORPHINE SULFATE 2 MG: 2 INJECTION, SOLUTION INTRAMUSCULAR; INTRAVENOUS at 13:22

## 2021-03-21 RX ADMIN — ONDANSETRON HYDROCHLORIDE 4 MG: 2 SOLUTION INTRAMUSCULAR; INTRAVENOUS at 13:23

## 2021-03-21 RX ADMIN — MORPHINE SULFATE 2 MG: 2 INJECTION, SOLUTION INTRAMUSCULAR; INTRAVENOUS at 16:20

## 2021-03-21 NOTE — ED PROVIDER NOTES
Subjective   85 yof presents with c/o intermittent RUQ abdomen pain.  She states she has been having issues for 'several weeks.'  She states she has been seeing an NP who has ordered her pain medication.  She states she was told she had 'done all she could do.'  She is also having nausea.  She denies vomiting or diarrhea. She denies fever or dysuria.  She denies  cough.  She denies CP or SOB. She describes the pain as sharp. She denies bloody stools.  She states she has not had any diagnostic testing.           Review of Systems   Constitutional: Negative for activity change, appetite change, fatigue and fever.   HENT: Negative for congestion, ear pain, facial swelling and sore throat.    Eyes: Negative for discharge and visual disturbance.   Respiratory: Negative for apnea, chest tightness, shortness of breath, wheezing and stridor.    Cardiovascular: Negative for chest pain and palpitations.   Gastrointestinal: Positive for abdominal pain. Negative for abdominal distention, diarrhea, nausea and vomiting.   Genitourinary: Negative for difficulty urinating and dysuria.   Musculoskeletal: Negative for arthralgias and myalgias.   Skin: Negative for rash and wound.   Neurological: Negative for dizziness and seizures.   Psychiatric/Behavioral: Negative for agitation and confusion.       Past Medical History:   Diagnosis Date   • Abnormal EKG 2/1/2019   • Atrioventricular block, Mobitz type 1, Wenckebach    • Heart murmur    • Hyperglycemia    • Hypertension    • Insomnia    • Neuropathy    • Orthostasis    • Skin cancer     unknown what type   • SVT (supraventricular tachycardia) (CMS/HCC)    • Syncope        No Known Allergies    Past Surgical History:   Procedure Laterality Date   • APPENDECTOMY  03/20/2007   • BREAST BIOPSY     • CATARACT EXTRACTION      with lens implants   • HEMORRHOIDECTOMY  03/20/2007   • LUMBAR SPINE SURGERY  2002   • SKIN CANCER EXCISION         Family History   Problem Relation Age of Onset    • Stomach cancer Mother    • Heart attack Father    • Lymphoma Sister    • Heart disease Brother    • Lung cancer Son        Social History     Socioeconomic History   • Marital status:      Spouse name: Not on file   • Number of children: Not on file   • Years of education: Not on file   • Highest education level: Not on file   Tobacco Use   • Smoking status: Current Every Day Smoker     Packs/day: 0.25     Years: 60.00     Pack years: 15.00     Types: Cigarettes     Start date: 1956   • Smokeless tobacco: Never Used   • Tobacco comment: smoking 4-5 cigs per day (had been 1-3 per day at last visit), smoked less than 1/2 ppd for years - only able to completely quit for 2-3 months at a time, quit several times   Substance and Sexual Activity   • Alcohol use: No   • Drug use: No   • Sexual activity: Defer           Objective   Physical Exam  Vitals and nursing note reviewed.   Constitutional:       Appearance: She is well-developed.   HENT:      Head: Normocephalic.   Eyes:      Pupils: Pupils are equal, round, and reactive to light.   Cardiovascular:      Rate and Rhythm: Normal rate and regular rhythm.      Heart sounds: No murmur heard.     Pulmonary:      Effort: Pulmonary effort is normal.      Breath sounds: Normal breath sounds.   Abdominal:      General: Bowel sounds are normal.      Palpations: Abdomen is soft.      Tenderness: There is no abdominal tenderness. There is no right CVA tenderness or left CVA tenderness.   Musculoskeletal:         General: Normal range of motion.      Cervical back: Normal range of motion and neck supple.   Skin:     General: Skin is warm and dry.   Neurological:      Mental Status: She is alert and oriented to person, place, and time.         Procedures          No current facility-administered medications for this encounter.    Current Outpatient Medications:   •  ALPRAZolam (XANAX) 0.5 MG tablet, Take 0.5 mg by mouth 2 (Two) Times a Day As Needed., Disp: , Rfl:   •   "amLODIPine-benazepril (LOTREL) 10-20 MG per capsule, Take 1 capsule by mouth Daily., Disp: , Rfl:   •  aspirin 81 MG EC tablet, Take 81 mg by mouth Daily., Disp: , Rfl:   •  gabapentin (NEURONTIN) 300 MG capsule, Take 300 mg by mouth 3 (Three) Times a Day., Disp: , Rfl:   •  hydrochlorothiazide (HYDRODIURIL) 25 MG tablet, Take 1 tablet by mouth Daily., Disp: 30 tablet, Rfl: 11  •  omeprazole (priLOSEC) 20 MG capsule, Take 1 capsule by mouth Daily for 14 days., Disp: 14 capsule, Rfl: 0  •  ondansetron ODT (ZOFRAN-ODT) 4 MG disintegrating tablet, Place 1 tablet on the tongue Every 6 (Six) Hours As Needed for Nausea or Vomiting for up to 3 days., Disp: 12 tablet, Rfl: 0  •  traMADol (ULTRAM) 50 MG tablet, Take 50 mg by mouth 3 (Three) Times a Day As Needed., Disp: , Rfl:   •  vitamin D (ERGOCALCIFEROL) 13777 units capsule capsule, Take 50,000 Units by mouth 1 (One) Time Per Week., Disp: , Rfl:   •  zolpidem CR (AMBIEN CR) 12.5 MG CR tablet, Take 12.5 mg by mouth Every Night., Disp: , Rfl:     Vital signs:  /80   Pulse 80   Temp 98.5 °F (36.9 °C)   Resp 20   Ht 160 cm (63\")   Wt 53.1 kg (117 lb)   SpO2 98%   BMI 20.73 kg/m²        ED LAB RESULTS:   Lab Results (last 24 hours)     ** No results found for the last 24 hours. **             IMAGING RESULTS  CT Abdomen Pelvis With Contrast   ED Interpretation   See results below      Final Result   1. Thickening versus underdistention of the right colon, which contains   fluidlike stool. Consider colitis.   2. Indeterminate right adrenal nodule measuring 1.2 cm.   3. Diffuse colonic diverticula. No evidence of acute diverticulitis.   4. Calcified atherosclerosis.   5. Multilevel age-indeterminate compression deformities. Probable severe   spinal canal stenosis at L2-3. Correlate with patient's symptoms.       This report was finalized on 03/21/2021 15:17 by Dr Neisha Linder MD.      XR Chest 1 View   ED Interpretation   See results below      Final Result   1. " No acute cardiopulmonary findings.   This report was finalized on 03/21/2021 14:23 by Dr Neisha Linder MD.                       ED Course  ED Course as of Mar 22 1431   Sun Mar 21, 2021   2079 I discussed the patient's history, assessment and testing results with Dr Guerin.  She will be dc'd home to f/u with PCP for additional outpatient testing. I will also give her some medication for her symptoms. The patient voiced understanding of d'c instructions as well as testing results.     [KS]      ED Course User Index  [KS] Yeyo Duarte, RAVEN                                           MDM  Number of Diagnoses or Management Options  Right upper quadrant abdominal pain: established and worsening     Amount and/or Complexity of Data Reviewed  Clinical lab tests: ordered and reviewed  Tests in the radiology section of CPT®: ordered and reviewed  Discuss the patient with other providers: yes  Independent visualization of images, tracings, or specimens: yes    Risk of Complications, Morbidity, and/or Mortality  Presenting problems: minimal  Diagnostic procedures: minimal  Management options: minimal    Patient Progress  Patient progress: stable      Final diagnoses:   Right upper quadrant abdominal pain       ED Disposition  ED Disposition     ED Disposition Condition Comment    Discharge Stable           Winifred Kramer, APRN  [address] [address]  [phone]    Schedule an appointment as soon as possible for a visit in 2 days  Routine ED follow up         Medication List      New Prescriptions    omeprazole 20 MG capsule  Commonly known as: priLOSEC  Take 1 capsule by mouth Daily for 14 days.     ondansetron ODT 4 MG disintegrating tablet  Commonly known as: ZOFRAN-ODT  Place 1 tablet on the tongue Every 6 (Six) Hours As Needed for Nausea or Vomiting for up to 3 days.           Where to Get Your Medications      These medications were sent to [place], [place] - [address]  [place] - [phone] [facility] [phone] [facility]  [address] [place] [address]    Phone: [phone]   · omeprazole 20 MG capsule  · ondansetron ODT 4 MG disintegrating tablet          Yeyo Duarte, APRN  03/22/21 [id number]

## 2021-03-21 NOTE — DISCHARGE INSTRUCTIONS
Drink plenty of fluid.  [place] diet. Medication as ordered. Follow up with PCP Monday - call for appointment.  Discuss outpatient testing for gallbladder and colitis.  Return to ED if condition does not improve or worsens

## 2021-03-22 LAB
QT INTERVAL: 362 MS
QTC INTERVAL: 422 MS

## 2021-03-26 LAB
BACTERIA SPEC AEROBE CULT: NORMAL
BACTERIA SPEC AEROBE CULT: NORMAL

## 2023-08-16 ENCOUNTER — OFFICE VISIT (OUTPATIENT)
Dept: CARDIOLOGY | Facility: CLINIC | Age: 88
End: 2023-08-16
Payer: MEDICARE

## 2023-08-16 VITALS
HEIGHT: 63 IN | HEART RATE: 99 BPM | DIASTOLIC BLOOD PRESSURE: 72 MMHG | SYSTOLIC BLOOD PRESSURE: 110 MMHG | OXYGEN SATURATION: 96 % | WEIGHT: 118 LBS | BODY MASS INDEX: 20.91 KG/M2

## 2023-08-16 DIAGNOSIS — R01.1 HEART MURMUR: Primary | ICD-10-CM
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
## 2023-08-16 DIAGNOSIS — Z72.0 TOBACCO USE: ICD-10-CM

## 2023-08-16 DIAGNOSIS — I10 ESSENTIAL HYPERTENSION: ICD-10-CM

## 2023-08-16 DIAGNOSIS — R06.02 SHORTNESS OF BREATH: ICD-10-CM

## 2023-08-16 RX ORDER — CITALOPRAM HYDROBROMIDE 10 MG/1
10 TABLET ORAL DAILY
COMMUNITY

## 2023-08-16 RX ORDER — BENAZEPRIL HYDROCHLORIDE 20 MG/1
TABLET ORAL
COMMUNITY
Start: 2023-08-09

## 2023-08-16 RX ORDER — PREGABALIN 75 MG/1
75 CAPSULE ORAL 2 TIMES DAILY
COMMUNITY

## 2023-08-16 RX ORDER — MEGESTROL ACETATE 40 MG/ML
SUSPENSION ORAL
COMMUNITY
Start: 2023-07-20

## 2023-08-16 RX ORDER — HYDROCHLOROTHIAZIDE 12.5 MG/1
12.5 TABLET ORAL DAILY
COMMUNITY

## 2023-08-16 RX ORDER — HYDROXYZINE HYDROCHLORIDE 10 MG/1
10 TABLET, FILM COATED ORAL 3 TIMES DAILY PRN
COMMUNITY

## 2023-08-16 RX ORDER — LANOLIN ALCOHOL/MO/W.PET/CERES
1000 CREAM (GRAM) TOPICAL DAILY
COMMUNITY

## 2023-08-16 RX ORDER — BUSPIRONE HYDROCHLORIDE 10 MG/1
10 TABLET ORAL 3 TIMES DAILY
COMMUNITY

## 2023-08-16 NOTE — PROGRESS NOTES
Reason for Visit: Heart murmur, shortness of breath.    HPI:  Kim Davison is a 87 y.o. female is being seen for consultation today at the request of RAVEN Barney for evaluation of a heart murmur and shortness of breath.  She has noticed this primarily over the past 2 weeks.  She reports it primarily when she is up moving.  She tends to wear out very quickly.  She likes to stay busy but her son reports that she is very sedentary.  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  She has large areas of ecchymosis and multiple Steri-Strips in place.  She smokes about a quarter of a pack a day and is not ready to quit at this time.    Previous Cardiac Testing and Procedures:  -Holter monitor (1/8/2019) rare PACs and PVCs, no significant pauses, arrhythmias, or events, no symptoms reported, benign monitor study  -Echo (2/13/2019) EF 60-65%, grade 1 diastolic dysfunction, mild AI, normal RV size and function  -Stress echo (2/13/2019) low risk for ischemia  -Holter monitor (4/8/2019) rare PVCs, rare PACs with runs of nonsustained SVT, Mobitz 1 second-degree AV block, no symptoms  -Table test (4/24/2019) negative for syncope, orthostatic response to upright tilt  -Chest x-ray (3/21/2021) no acute cardiopulmonary finding  -Echo (8/15/2023) EF 60 to 65%, moderate LVH, grade 1 diastolic dysfunction, mild MR    Patient Active Problem List   Diagnosis    Palpitations    Abnormal EKG    Hypertension    Shortness of breath    Atrioventricular block, Mobitz type 1, Wenckebach    SVT (supraventricular tachycardia)    Orthostasis    Weight loss, non-intentional    Tobacco use       Social History     Tobacco Use    Smoking status: Every Day     Packs/day: 0.25     Years: 60.00     Pack years: 15.00     Types: Cigarettes     Start date: 1956    Smokeless tobacco: Never    Tobacco comments:     smoking 4-5 cigs per day (had been 1-3 per day at last visit), smoked less than 1/2 ppd for  years - only able to completely quit for 2-3 months at a time, quit several times   Vaping Use    Vaping Use: Never used   Substance Use Topics    Alcohol use: No    Drug use: No       Family History   Problem Relation Age of Onset    Stomach cancer Mother     Heart attack Father     Lymphoma Sister     Heart disease Brother     Lung cancer Son        The following portions of the patient's history were reviewed and updated as appropriate: allergies, current medications, past family history, past medical history, past social history, past surgical history, and problem list.      Current Outpatient Medications:     benazepril (LOTENSIN) 20 MG tablet, TAKE ONE TABLET EVERY DAY FOR HIGH BLOOD PRESSURE, Disp: , Rfl:     busPIRone (BUSPAR) 10 MG tablet, Take 1 tablet by mouth 3 (Three) Times a Day., Disp: , Rfl:     citalopram (CeleXA) 10 MG tablet, Take 1 tablet by mouth Daily., Disp: , Rfl:     hydroCHLOROthiazide (HYDRODIURIL) 12.5 MG tablet, Take 1 tablet by mouth Daily., Disp: , Rfl:     hydrOXYzine (ATARAX) 10 MG tablet, Take 1 tablet by mouth 3 (Three) Times a Day As Needed for Itching., Disp: , Rfl:     megestrol (MEGACE) 40 MG/ML suspension, TAKE 5-10 ML EVERY 8 HOURS TO INCREASE APPETITE, Disp: , Rfl:     metoprolol tartrate (LOPRESSOR) 25 MG tablet, Take 1 tablet by mouth 2 (Two) Times a Day., Disp: , Rfl:     pregabalin (LYRICA) 75 MG capsule, Take 1 capsule by mouth 2 (Two) Times a Day., Disp: , Rfl:     vitamin B-12 (CYANOCOBALAMIN) 1000 MCG tablet, Take 1 tablet by mouth Daily., Disp: , Rfl:     vitamin D (ERGOCALCIFEROL) 64802 units capsule capsule, Take 1 capsule by mouth 1 (One) Time Per Week., Disp: , Rfl:     zolpidem CR (AMBIEN CR) 12.5 MG CR tablet, Take 1 tablet by mouth Every Night., Disp: , Rfl:     Review of Systems   Constitutional: Negative for chills and fever.   Cardiovascular:  Positive for dyspnea on exertion. Negative for chest pain, palpitations and paroxysmal nocturnal dyspnea.  "  Respiratory:  Positive for shortness of breath. Negative for cough.    Skin:  Negative for rash.   Musculoskeletal:  Positive for falls and muscle weakness.   Gastrointestinal:  Negative for abdominal pain and heartburn.   Neurological:  Positive for disturbances in coordination and loss of balance. Negative for dizziness and numbness.     Objective   /72 (BP Location: Left arm, Patient Position: Sitting, Cuff Size: Adult)   Pulse 99   Ht 160 cm (62.99\")   Wt 53.5 kg (118 lb)   SpO2 96%   BMI 20.91 kg/mý   Constitutional:       Appearance: Well-developed.   HENT:      Head: Normocephalic and atraumatic.   Pulmonary:      Effort: Pulmonary effort is normal.      Breath sounds: Normal breath sounds.   Cardiovascular:      Normal rate. Regular rhythm.      Murmurs: There is a grade 1/6 holosystolic murmur.   Edema:     Peripheral edema absent.   Skin:     General: Skin is warm and dry.   Neurological:      Mental Status: Alert and oriented to person, place, and time.       ECG 12 Lead    Date/Time: 8/16/2023 3:47 PM  Performed by: Heath Hull MD  Authorized by: Heath Hull MD   Comparison: compared with previous ECG from 3/21/2021  Comparison to previous ECG: The axis has shifted right  Rhythm: sinus rhythm  Rate: normal  QRS axis: right          ICD-10-CM ICD-9-CM   1. Heart murmur  R01.1 785.2   2. Shortness of breath  R06.02 786.05   3. Essential hypertension  I10 401.9   4. Tobacco use  Z72.0 305.1   5. Fall, subsequent encounter  W19.XXXD V58.89     E888.9         Assessment/Plan:  1.  Heart murmur: Likely a benign flow murmur.  There is no significant (greater than mild) valvular dysfunction on recent echo from 8/15/2023.    2.  Shortness of breath: Likely multifactorial due to deconditioning and sedentary lifestyle, possible component of COPD, as well as possible anginal equivalent.  Echo from 8/15/2023 did show normal LV systolic function with grade 1 diastolic dysfunction, LVH, and " normal RV size and function.  We discussed stress testing for ischemic evaluation and she prefers to avoid any further cardiac testing at this time.  If symptoms worsen this can be reconsidered.    3.  Essential hypertension: Blood pressure is well controlled today.  Continue benazepril, HCTZ, and metoprolol.    4.  Tobacco use: Kim Davison  reports that she has been smoking cigarettes. She started smoking about 67 years ago. She has a 15.00 pack-year smoking history. She has never used smokeless tobacco.. I have educated her on the risk of diseases from using tobacco products such as cancer, COPD, and heart disease.  I advised her to quit and she is not willing to quit.  I spent 3.5 minutes counseling the patient.    5.  Falls: Secondary to physical deconditioning and balance issue.  Patient denies any loss of consciousness.  Encouraged her to keep her walker with her at all times.

## 2023-08-16 NOTE — LETTER
August 16, 2023     RAVEN Barney  [address] [place] [id number]  [facility] [address]    Patient: Kim Davison   YOB: 1935   Date of Visit: 8/16/2023       Dear RAVEN Barney,    Thank you for referring Kim Davison to me for evaluation. Below is a copy of my consult note.    If you have questions, please do not hesitate to call me. I look forward to following Kim along with you.         Sincerely,        Heath Hull MD        CC: No Recipients      Reason for Visit: Heart murmur, shortness of breath.    HPI:  Kim Davison is a 87 y.o. female is being seen for consultation today at the request of RAVEN Barney for evaluation of a heart murmur and shortness of breath.  She has noticed this primarily over the past 2 weeks.  She reports it primarily when she is up moving.  She tends to wear out very quickly.  She likes to stay busy but her son reports that she is very sedentary.  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  She has large areas of ecchymosis and multiple Steri-Strips in place.  She smokes about a quarter of a pack a day and is not ready to quit at this time.    Previous Cardiac Testing and Procedures:  -Holter monitor (1/8/2019) rare PACs and PVCs, no significant pauses, arrhythmias, or events, no symptoms reported, benign monitor study  -Echo (2/13/2019) EF 60-65%, grade 1 diastolic dysfunction, mild AI, normal RV size and function  -Stress echo (2/13/2019) low risk for ischemia  -Holter monitor (4/8/2019) rare PVCs, rare PACs with runs of nonsustained SVT, Mobitz 1 second-degree AV block, no symptoms  -Table test (4/24/2019) negative for syncope, orthostatic response to upright tilt  -Chest x-ray (3/21/2021) no acute cardiopulmonary finding  -Echo (8/15/2023) EF 60 to 65%, moderate LVH, grade 1 diastolic dysfunction, mild MR    Patient Active Problem List   Diagnosis    Palpitations    Abnormal EKG    Hypertension     Shortness of breath    Atrioventricular block, Mobitz type 1, Wenckebach    SVT (supraventricular tachycardia)    Orthostasis    Weight loss, non-intentional    Tobacco use       Social History     Tobacco Use    Smoking status: Every Day     Packs/day: 0.25     Years: 60.00     Pack years: 15.00     Types: Cigarettes     Start date: 1956    Smokeless tobacco: Never    Tobacco comments:     smoking 4-5 cigs per day (had been 1-3 per day at last visit), smoked less than 1/2 ppd for years - only able to completely quit for 2-3 months at a time, quit several times   Vaping Use    Vaping Use: Never used   Substance Use Topics    Alcohol use: No    Drug use: No       Family History   Problem Relation Age of Onset    Stomach cancer Mother     Heart attack Father     Lymphoma Sister     Heart disease Brother     Lung cancer Son        The following portions of the patient's history were reviewed and updated as appropriate: allergies, current medications, past family history, past medical history, past social history, past surgical history, and problem list.      Current Outpatient Medications:     benazepril (LOTENSIN) 20 MG tablet, TAKE ONE TABLET EVERY DAY FOR HIGH BLOOD PRESSURE, Disp: , Rfl:     busPIRone (BUSPAR) 10 MG tablet, Take 1 tablet by mouth 3 (Three) Times a Day., Disp: , Rfl:     citalopram (CeleXA) 10 MG tablet, Take 1 tablet by mouth Daily., Disp: , Rfl:     hydroCHLOROthiazide (HYDRODIURIL) 12.5 MG tablet, Take 1 tablet by mouth Daily., Disp: , Rfl:     hydrOXYzine (ATARAX) 10 MG tablet, Take 1 tablet by mouth 3 (Three) Times a Day As Needed for Itching., Disp: , Rfl:     megestrol (MEGACE) 40 MG/ML suspension, TAKE 5-10 ML EVERY 8 HOURS TO INCREASE APPETITE, Disp: , Rfl:     metoprolol tartrate (LOPRESSOR) 25 MG tablet, Take 1 tablet by mouth 2 (Two) Times a Day., Disp: , Rfl:     pregabalin (LYRICA) 75 MG capsule, Take 1 capsule by mouth 2 (Two) Times a Day., Disp: , Rfl:     " vitamin B-12 (CYANOCOBALAMIN) 1000 MCG tablet, Take 1 tablet by mouth Daily., Disp: , Rfl:     vitamin D (ERGOCALCIFEROL) 23491 units capsule capsule, Take 1 capsule by mouth 1 (One) Time Per Week., Disp: , Rfl:     zolpidem CR (AMBIEN CR) 12.5 MG CR tablet, Take 1 tablet by mouth Every Night., Disp: , Rfl:     Review of Systems   Constitutional: Negative for chills and fever.   Cardiovascular:  Positive for dyspnea on exertion. Negative for chest pain, palpitations and paroxysmal nocturnal dyspnea.   Respiratory:  Positive for shortness of breath. Negative for cough.    Skin:  Negative for rash.   Musculoskeletal:  Positive for falls and muscle weakness.   Gastrointestinal:  Negative for abdominal pain and heartburn.   Neurological:  Positive for disturbances in coordination and loss of balance. Negative for dizziness and numbness.     Objective  /72 (BP Location: Left arm, Patient Position: Sitting, Cuff Size: Adult)   Pulse 99   Ht 160 cm (62.99\")   Wt 53.5 kg (118 lb)   SpO2 96%   BMI 20.91 kg/mý   Constitutional:       Appearance: Well-developed.   HENT:      Head: Normocephalic and atraumatic.   Pulmonary:      Effort: Pulmonary effort is normal.      Breath sounds: Normal breath sounds.   Cardiovascular:      Normal rate. Regular rhythm.      Murmurs: There is a grade 1/6 holosystolic murmur.   Edema:     Peripheral edema absent.   Skin:     General: Skin is warm and dry.   Neurological:      Mental Status: Alert and oriented to person, place, and time.       ECG 12 Lead    Date/Time: 8/16/2023 3:47 PM  Performed by: Heath Hull MD  Authorized by: Heath Hull MD   Comparison: compared with previous ECG from 3/21/2021  Comparison to previous ECG: The axis has shifted right  Rhythm: sinus rhythm  Rate: normal  QRS axis: right          ICD-10-CM ICD-9-CM   1. Heart murmur  R01.1 785.2   2. Shortness of breath  R06.02 786.05   3. Essential hypertension  I10 401.9   4. Tobacco use  Z72.0 " 305.1   5. Fall, subsequent encounter  W19.XXXD V58.89     E888.9         Assessment/Plan:  1.  Heart murmur: Likely a benign flow murmur.  There is no significant (greater than mild) valvular dysfunction on recent echo from 8/15/2023.    2.  Shortness of breath: Likely multifactorial due to deconditioning and sedentary lifestyle, possible component of COPD, as well as possible anginal equivalent.  Echo from 8/15/2023 did show normal LV systolic function with grade 1 diastolic dysfunction, LVH, and normal RV size and function.  We discussed stress testing for ischemic evaluation and she prefers to avoid any further cardiac testing at this time.  If symptoms worsen this can be reconsidered.    3.  Essential hypertension: Blood pressure is well controlled today.  Continue benazepril, HCTZ, and metoprolol.    4.  Tobacco use: Kim Davison  reports that she has been smoking cigarettes. She started smoking about 67 years ago. She has a 15.00 pack-year smoking history. She has never used smokeless tobacco.. I have educated her on the risk of diseases from using tobacco products such as cancer, COPD, and heart disease.  I advised her to quit and she is not willing to quit.  I spent 3.5 minutes counseling the patient.    5.  Falls: Secondary to physical deconditioning and balance issue.  Patient denies any loss of consciousness.  Encouraged her to keep her walker with her at all times.

## 2023-10-16 ENCOUNTER — APPOINTMENT (OUTPATIENT)
Dept: GENERAL RADIOLOGY | Facility: HOSPITAL | Age: 88
DRG: 690 | End: 2023-10-16
Payer: MEDICARE

## 2023-10-16 ENCOUNTER — APPOINTMENT (OUTPATIENT)
Dept: CT IMAGING | Facility: HOSPITAL | Age: 88
DRG: 690 | End: 2023-10-16
Payer: MEDICARE

## 2023-10-16 ENCOUNTER — HOSPITAL ENCOUNTER (INPATIENT)
Facility: HOSPITAL | Age: 88
LOS: 3 days | Discharge: NURSING, REHAB OR LONG-TERM CARE | DRG: 690 | End: 2023-10-19
Attending: EMERGENCY MEDICINE | Admitting: FAMILY MEDICINE
Payer: MEDICARE

## 2023-10-16 DIAGNOSIS — R41.0 CONFUSION: ICD-10-CM

## 2023-10-16 DIAGNOSIS — Z74.09 IMPAIRED FUNCTIONAL MOBILITY AND ACTIVITY TOLERANCE: ICD-10-CM

## 2023-10-16 DIAGNOSIS — N39.0 ACUTE UTI (URINARY TRACT INFECTION): ICD-10-CM

## 2023-10-16 DIAGNOSIS — S81.802S WOUND OF LEFT LOWER EXTREMITY, SEQUELA: ICD-10-CM

## 2023-10-16 DIAGNOSIS — Z78.9 DECREASED ACTIVITIES OF DAILY LIVING (ADL): Primary | ICD-10-CM

## 2023-10-16 LAB
ALBUMIN SERPL-MCNC: 3.7 G/DL (ref 3.5–5.2)
ALBUMIN/GLOB SERPL: 1.3 G/DL
ALP SERPL-CCNC: 114 U/L (ref 39–117)
ALT SERPL W P-5'-P-CCNC: 8 U/L (ref 1–33)
ANION GAP SERPL CALCULATED.3IONS-SCNC: 10 MMOL/L (ref 5–15)
AST SERPL-CCNC: 11 U/L (ref 1–32)
BACTERIA UR QL AUTO: ABNORMAL /HPF
BASOPHILS # BLD AUTO: 0.06 10*3/MM3 (ref 0–0.2)
BASOPHILS NFR BLD AUTO: 1.2 % (ref 0–1.5)
BILIRUB SERPL-MCNC: 0.5 MG/DL (ref 0–1.2)
BILIRUB UR QL STRIP: NEGATIVE
BUN SERPL-MCNC: 21 MG/DL (ref 8–23)
BUN/CREAT SERPL: 23.6 (ref 7–25)
CALCIUM SPEC-SCNC: 9.4 MG/DL (ref 8.6–10.5)
CHLORIDE SERPL-SCNC: 105 MMOL/L (ref 98–107)
CLARITY UR: ABNORMAL
CO2 SERPL-SCNC: 25 MMOL/L (ref 22–29)
COLOR UR: ABNORMAL
CREAT SERPL-MCNC: 0.89 MG/DL (ref 0.57–1)
D-LACTATE SERPL-SCNC: 1 MMOL/L (ref 0.5–2)
DEPRECATED RDW RBC AUTO: 42.7 FL (ref 37–54)
EGFRCR SERPLBLD CKD-EPI 2021: 63.2 ML/MIN/1.73
EOSINOPHIL # BLD AUTO: 0.18 10*3/MM3 (ref 0–0.4)
EOSINOPHIL NFR BLD AUTO: 3.5 % (ref 0.3–6.2)
ERYTHROCYTE [DISTWIDTH] IN BLOOD BY AUTOMATED COUNT: 13.2 % (ref 12.3–15.4)
GLOBULIN UR ELPH-MCNC: 2.8 GM/DL
GLUCOSE SERPL-MCNC: 103 MG/DL (ref 65–99)
GLUCOSE UR STRIP-MCNC: NEGATIVE MG/DL
HCT VFR BLD AUTO: 39.9 % (ref 34–46.6)
HGB BLD-MCNC: 13 G/DL (ref 12–15.9)
HGB UR QL STRIP.AUTO: ABNORMAL
IMM GRANULOCYTES # BLD AUTO: 0.04 10*3/MM3 (ref 0–0.05)
IMM GRANULOCYTES NFR BLD AUTO: 0.8 % (ref 0–0.5)
KETONES UR QL STRIP: ABNORMAL
LEUKOCYTE ESTERASE UR QL STRIP.AUTO: ABNORMAL
LYMPHOCYTES # BLD AUTO: 1.56 10*3/MM3 (ref 0.7–3.1)
LYMPHOCYTES NFR BLD AUTO: 30 % (ref 19.6–45.3)
MAGNESIUM SERPL-MCNC: 2.1 MG/DL (ref 1.6–2.4)
MCH RBC QN AUTO: 29.1 PG (ref 26.6–33)
MCHC RBC AUTO-ENTMCNC: 32.6 G/DL (ref 31.5–35.7)
MCV RBC AUTO: 89.5 FL (ref 79–97)
MONOCYTES # BLD AUTO: 0.49 10*3/MM3 (ref 0.1–0.9)
MONOCYTES NFR BLD AUTO: 9.4 % (ref 5–12)
NEUTROPHILS NFR BLD AUTO: 2.87 10*3/MM3 (ref 1.7–7)
NEUTROPHILS NFR BLD AUTO: 55.1 % (ref 42.7–76)
NITRITE UR QL STRIP: POSITIVE
NRBC BLD AUTO-RTO: 0 /100 WBC (ref 0–0.2)
PH UR STRIP.AUTO: 8.5 [PH] (ref 5–8)
PLATELET # BLD AUTO: 397 10*3/MM3 (ref 140–450)
PMV BLD AUTO: 8.6 FL (ref 6–12)
POTASSIUM SERPL-SCNC: 4.1 MMOL/L (ref 3.5–5.2)
PROCALCITONIN SERPL-MCNC: 0.06 NG/ML (ref 0–0.25)
PROT SERPL-MCNC: 6.5 G/DL (ref 6–8.5)
PROT UR QL STRIP: ABNORMAL
RBC # BLD AUTO: 4.46 10*6/MM3 (ref 3.77–5.28)
RBC # UR STRIP: ABNORMAL /HPF
REF LAB TEST METHOD: ABNORMAL
SODIUM SERPL-SCNC: 140 MMOL/L (ref 136–145)
SP GR UR STRIP: 1.02 (ref 1–1.03)
SQUAMOUS #/AREA URNS HPF: ABNORMAL /HPF
UROBILINOGEN UR QL STRIP: ABNORMAL
WBC # UR STRIP: ABNORMAL /HPF
WBC NRBC COR # BLD: 5.2 10*3/MM3 (ref 3.4–10.8)

## 2023-10-16 PROCEDURE — 25010000002 CEFTRIAXONE PER 250 MG: Performed by: EMERGENCY MEDICINE

## 2023-10-16 PROCEDURE — 25010000002 SODIUM CHLORIDE 0.9 % WITH KCL 20 MEQ 20-0.9 MEQ/L-% SOLUTION: Performed by: FAMILY MEDICINE

## 2023-10-16 PROCEDURE — 81001 URINALYSIS AUTO W/SCOPE: CPT | Performed by: EMERGENCY MEDICINE

## 2023-10-16 PROCEDURE — 93005 ELECTROCARDIOGRAM TRACING: CPT | Performed by: EMERGENCY MEDICINE

## 2023-10-16 PROCEDURE — 99285 EMERGENCY DEPT VISIT HI MDM: CPT

## 2023-10-16 PROCEDURE — 87186 SC STD MICRODIL/AGAR DIL: CPT | Performed by: EMERGENCY MEDICINE

## 2023-10-16 PROCEDURE — P9612 CATHETERIZE FOR URINE SPEC: HCPCS

## 2023-10-16 PROCEDURE — 87086 URINE CULTURE/COLONY COUNT: CPT | Performed by: EMERGENCY MEDICINE

## 2023-10-16 PROCEDURE — 87077 CULTURE AEROBIC IDENTIFY: CPT | Performed by: EMERGENCY MEDICINE

## 2023-10-16 PROCEDURE — 93010 ELECTROCARDIOGRAM REPORT: CPT | Performed by: INTERNAL MEDICINE

## 2023-10-16 PROCEDURE — 80053 COMPREHEN METABOLIC PANEL: CPT | Performed by: EMERGENCY MEDICINE

## 2023-10-16 PROCEDURE — 85025 COMPLETE CBC W/AUTO DIFF WBC: CPT | Performed by: EMERGENCY MEDICINE

## 2023-10-16 PROCEDURE — 83735 ASSAY OF MAGNESIUM: CPT | Performed by: EMERGENCY MEDICINE

## 2023-10-16 PROCEDURE — 87040 BLOOD CULTURE FOR BACTERIA: CPT | Performed by: EMERGENCY MEDICINE

## 2023-10-16 PROCEDURE — 83605 ASSAY OF LACTIC ACID: CPT | Performed by: EMERGENCY MEDICINE

## 2023-10-16 PROCEDURE — 84145 PROCALCITONIN (PCT): CPT | Performed by: EMERGENCY MEDICINE

## 2023-10-16 PROCEDURE — 71045 X-RAY EXAM CHEST 1 VIEW: CPT

## 2023-10-16 PROCEDURE — 36415 COLL VENOUS BLD VENIPUNCTURE: CPT

## 2023-10-16 RX ORDER — HYDROCHLOROTHIAZIDE 25 MG/1
12.5 TABLET ORAL DAILY
Status: DISCONTINUED | OUTPATIENT
Start: 2023-10-17 | End: 2023-10-16 | Stop reason: SDUPTHER

## 2023-10-16 RX ORDER — SODIUM CHLORIDE 0.9 % (FLUSH) 0.9 %
10 SYRINGE (ML) INJECTION AS NEEDED
Status: DISCONTINUED | OUTPATIENT
Start: 2023-10-16 | End: 2023-10-19 | Stop reason: HOSPADM

## 2023-10-16 RX ORDER — SODIUM CHLORIDE AND POTASSIUM CHLORIDE 150; 900 MG/100ML; MG/100ML
50 INJECTION, SOLUTION INTRAVENOUS CONTINUOUS
Status: DISCONTINUED | OUTPATIENT
Start: 2023-10-16 | End: 2023-10-19 | Stop reason: HOSPADM

## 2023-10-16 RX ORDER — POLYETHYLENE GLYCOL 3350 17 G/17G
17 POWDER, FOR SOLUTION ORAL DAILY PRN
Status: DISCONTINUED | OUTPATIENT
Start: 2023-10-16 | End: 2023-10-19 | Stop reason: HOSPADM

## 2023-10-16 RX ORDER — CHOLECALCIFEROL (VITAMIN D3) 125 MCG
1000 CAPSULE ORAL DAILY
Status: DISCONTINUED | OUTPATIENT
Start: 2023-10-16 | End: 2023-10-19 | Stop reason: HOSPADM

## 2023-10-16 RX ORDER — BUSPIRONE HYDROCHLORIDE 10 MG/1
10 TABLET ORAL EVERY 8 HOURS SCHEDULED
Status: DISCONTINUED | OUTPATIENT
Start: 2023-10-16 | End: 2023-10-19 | Stop reason: HOSPADM

## 2023-10-16 RX ORDER — BISACODYL 5 MG/1
5 TABLET, DELAYED RELEASE ORAL DAILY PRN
Status: DISCONTINUED | OUTPATIENT
Start: 2023-10-16 | End: 2023-10-19 | Stop reason: HOSPADM

## 2023-10-16 RX ORDER — ZOLPIDEM TARTRATE 10 MG/1
10 TABLET ORAL NIGHTLY PRN
COMMUNITY
End: 2023-10-19 | Stop reason: HOSPADM

## 2023-10-16 RX ORDER — BISACODYL 10 MG
10 SUPPOSITORY, RECTAL RECTAL DAILY PRN
Status: DISCONTINUED | OUTPATIENT
Start: 2023-10-16 | End: 2023-10-19 | Stop reason: HOSPADM

## 2023-10-16 RX ORDER — CITALOPRAM HYDROBROMIDE 10 MG/1
10 TABLET ORAL DAILY
Status: DISCONTINUED | OUTPATIENT
Start: 2023-10-16 | End: 2023-10-19 | Stop reason: HOSPADM

## 2023-10-16 RX ORDER — LISINOPRIL 10 MG/1
20 TABLET ORAL
Status: DISCONTINUED | OUTPATIENT
Start: 2023-10-17 | End: 2023-10-16 | Stop reason: SDUPTHER

## 2023-10-16 RX ORDER — AMLODIPINE BESYLATE 5 MG/1
5 TABLET ORAL
Status: DISCONTINUED | OUTPATIENT
Start: 2023-10-16 | End: 2023-10-17

## 2023-10-16 RX ORDER — PREGABALIN 75 MG/1
75 CAPSULE ORAL 2 TIMES DAILY
Status: DISCONTINUED | OUTPATIENT
Start: 2023-10-16 | End: 2023-10-19 | Stop reason: HOSPADM

## 2023-10-16 RX ORDER — HYDROCHLOROTHIAZIDE 25 MG/1
12.5 TABLET ORAL DAILY
Status: DISCONTINUED | OUTPATIENT
Start: 2023-10-16 | End: 2023-10-19 | Stop reason: HOSPADM

## 2023-10-16 RX ORDER — LISINOPRIL 20 MG/1
20 TABLET ORAL
Status: DISCONTINUED | OUTPATIENT
Start: 2023-10-16 | End: 2023-10-19 | Stop reason: HOSPADM

## 2023-10-16 RX ORDER — ONDANSETRON 4 MG/1
4 TABLET, FILM COATED ORAL EVERY 6 HOURS PRN
Status: DISCONTINUED | OUTPATIENT
Start: 2023-10-16 | End: 2023-10-19 | Stop reason: HOSPADM

## 2023-10-16 RX ORDER — SODIUM CHLORIDE 0.9 % (FLUSH) 0.9 %
10 SYRINGE (ML) INJECTION EVERY 12 HOURS SCHEDULED
Status: DISCONTINUED | OUTPATIENT
Start: 2023-10-16 | End: 2023-10-19 | Stop reason: HOSPADM

## 2023-10-16 RX ORDER — CITALOPRAM HYDROBROMIDE 10 MG/1
10 TABLET ORAL DAILY
Status: DISCONTINUED | OUTPATIENT
Start: 2023-10-17 | End: 2023-10-16 | Stop reason: SDUPTHER

## 2023-10-16 RX ORDER — HYDROCHLOROTHIAZIDE 12.5 MG/1
12.5 TABLET ORAL DAILY
COMMUNITY

## 2023-10-16 RX ORDER — ACETAMINOPHEN 325 MG/1
650 TABLET ORAL EVERY 4 HOURS PRN
Status: DISCONTINUED | OUTPATIENT
Start: 2023-10-16 | End: 2023-10-19 | Stop reason: HOSPADM

## 2023-10-16 RX ORDER — SODIUM CHLORIDE 9 MG/ML
40 INJECTION, SOLUTION INTRAVENOUS AS NEEDED
Status: DISCONTINUED | OUTPATIENT
Start: 2023-10-16 | End: 2023-10-19 | Stop reason: HOSPADM

## 2023-10-16 RX ORDER — HYDROXYZINE HYDROCHLORIDE 10 MG/1
10 TABLET, FILM COATED ORAL 3 TIMES DAILY PRN
Status: DISCONTINUED | OUTPATIENT
Start: 2023-10-16 | End: 2023-10-19 | Stop reason: HOSPADM

## 2023-10-16 RX ORDER — MEGESTROL ACETATE 40 MG/ML
200 SUSPENSION ORAL DAILY
Status: DISCONTINUED | OUTPATIENT
Start: 2023-10-16 | End: 2023-10-19 | Stop reason: HOSPADM

## 2023-10-16 RX ORDER — AMOXICILLIN 250 MG
2 CAPSULE ORAL NIGHTLY PRN
Status: DISCONTINUED | OUTPATIENT
Start: 2023-10-16 | End: 2023-10-19 | Stop reason: HOSPADM

## 2023-10-16 RX ADMIN — BUSPIRONE HYDROCHLORIDE 10 MG: 10 TABLET ORAL at 16:11

## 2023-10-16 RX ADMIN — CITALOPRAM 10 MG: 10 TABLET, FILM COATED ORAL at 16:11

## 2023-10-16 RX ADMIN — LISINOPRIL 20 MG: 20 TABLET ORAL at 16:11

## 2023-10-16 RX ADMIN — METOPROLOL TARTRATE 25 MG: 25 TABLET, FILM COATED ORAL at 16:11

## 2023-10-16 RX ADMIN — CEFTRIAXONE 1000 MG: 1 INJECTION, POWDER, FOR SOLUTION INTRAMUSCULAR; INTRAVENOUS at 14:18

## 2023-10-16 RX ADMIN — POTASSIUM CHLORIDE AND SODIUM CHLORIDE 50 ML/HR: 900; 150 INJECTION, SOLUTION INTRAVENOUS at 15:59

## 2023-10-16 RX ADMIN — Medication 1000 MCG: at 16:11

## 2023-10-16 RX ADMIN — MEGESTROL ACETATE 200 MG: 40 SUSPENSION ORAL at 16:11

## 2023-10-16 RX ADMIN — PREGABALIN 75 MG: 75 CAPSULE ORAL at 21:47

## 2023-10-16 RX ADMIN — AMLODIPINE BESYLATE 5 MG: 5 TABLET ORAL at 21:50

## 2023-10-16 RX ADMIN — Medication 10 ML: at 21:47

## 2023-10-16 RX ADMIN — BUSPIRONE HYDROCHLORIDE 10 MG: 10 TABLET ORAL at 21:47

## 2023-10-16 RX ADMIN — HYDROCHLOROTHIAZIDE 12.5 MG: 25 TABLET ORAL at 16:11

## 2023-10-16 NOTE — H&P
[facility] Medicine Services  HISTORY AND PHYSICAL    Date of Admission: 10/16/2023  Primary Care Physician: Winifred Kramer APRN    Subjective   Primary Historian: patient and family member    Chief Complaint: falls at home.     History of Present Illness  Patient presents the emergency room for evaluation.  She was noted to have been having increased falls at home.  She usually is able to ambulate with a walker.  Get up from seated position.  But over the last 2 to 3 weeks she has had increasing difficulty with walking and has been having increased falls.  She also notes when questioned that she has had some increased urinary tract difficulties.  She is incontinent of urine.  She has burning when she urinates.  She has not noticed any abbe blood.  She does note that she has a wound on the left leg that has been being treated by the wound care at [facility].  The family member in the room also notes that she has had home health with physical therapy and they are concerned that she might have some Parkinson's disease.  I did discuss with the patient and family member present that the first and we have to do fully diagnose anything such as Parkinson's disease clear of the urinary tract infection.        Review of Systems   Otherwise complete ROS reviewed and negative except as mentioned in the HPI.    Past Medical History:   Past Medical History:   Diagnosis Date    Abnormal EKG 2/1/2019    Atrioventricular block, Mobitz type 1, Wenckebach     Heart murmur     Hyperglycemia     Hypertension     Insomnia     Neuropathy     Orthostasis     Skin cancer     unknown what type    SVT (supraventricular tachycardia)     Syncope      Past Surgical History:  Past Surgical History:   Procedure Laterality Date    APPENDECTOMY  03/20/2007    BREAST BIOPSY      CATARACT EXTRACTION      with lens implants    HEMORRHOIDECTOMY  03/20/2007    LUMBAR SPINE SURGERY  2002    SKIN CANCER  EXCISION       Social History: Patient is  lives at home with her .  She does have a living will.  Her  is the durable 5 .  She does wish to be a full code.  Patient does smoke.  She has not done so for a number of years.  She smokes at least a half a pack a day.  She does not drink alcohol nor does she use any illicit drugs.    Family History: family history includes Heart attack in her father; Heart disease in her brother; Lung cancer in her son; Lymphoma in her sister; Stomach cancer in her mother.       Allergies:  No Known Allergies    Medications:  Prior to Admission medications    Medication Sig Start Date End Date Taking? Authorizing Provider   benazepril (LOTENSIN) 20 MG tablet TAKE ONE TABLET EVERY DAY FOR HIGH BLOOD PRESSURE 8/9/23   Brandi Meyers MD   busPIRone (BUSPAR) 10 MG tablet Take 1 tablet by mouth 3 (Three) Times a Day.    Brandi Meyers MD   citalopram (CeleXA) 10 MG tablet Take 1 tablet by mouth Daily.    Brandi Meyers MD   hydroCHLOROthiazide (HYDRODIURIL) 12.5 MG tablet Take 1 tablet by mouth Daily.    Brandi Meyers MD   hydrOXYzine (ATARAX) 10 MG tablet Take 1 tablet by mouth 3 (Three) Times a Day As Needed for Itching.    Brandi Meyers MD   megestrol (MEGACE) 40 MG/ML suspension TAKE 5-10 ML EVERY 8 HOURS TO INCREASE APPETITE 7/20/23   Brandi Meyers MD   metoprolol tartrate (LOPRESSOR) 25 MG tablet Take 1 tablet by mouth 2 (Two) Times a Day.    Brandi Meyers MD   pregabalin (LYRICA) 75 MG capsule Take 1 capsule by mouth 2 (Two) Times a Day.    Brandi Meyers MD   vitamin B-12 (CYANOCOBALAMIN) 1000 MCG tablet Take 1 tablet by mouth Daily.    Brandi Meyers MD   vitamin D (ERGOCALCIFEROL) 34017 units capsule capsule Take 1 capsule by mouth 1 (One) Time Per Week.    Brandi Meyers MD   zolpidem CR (AMBIEN CR) 12.5 MG CR tablet Take 1 tablet by mouth Every Night.    Brandi Meyers MD  "    I have utilized all available immediate resources to obtain, update, or review the patient's current medications (including all prescriptions, over-the-counter products, herbals, cannabis/cannabidiol products, and vitamin/mineral/dietary (nutritional) supplements).    Objective     Vital Signs: BP (!) 189/70   Pulse 72   Temp 97.9 °F (36.6 °C) (Temporal)   Resp 16   Ht 160 cm (63\")   Wt 53.5 kg (118 lb)   SpO2 96%   BMI 20.90 kg/m²   Physical Exam  Vitals and nursing note reviewed.   Constitutional:       Appearance: She is normal weight.   HENT:      Head: Normocephalic and atraumatic.      Right Ear: External ear normal.      Left Ear: External ear normal.      Nose: Nose normal.      Mouth/Throat:      Mouth: Mucous membranes are moist.   Eyes:      Extraocular Movements: Extraocular movements intact.      Conjunctiva/sclera: Conjunctivae normal.      Pupils: Pupils are equal, round, and reactive to light.      Comments: Wears glasses.   Cardiovascular:      Rate and Rhythm: Normal rate and regular rhythm.      Pulses: Normal pulses.      Heart sounds: Normal heart sounds.   Pulmonary:      Effort: Pulmonary effort is normal.      Breath sounds: Normal breath sounds.   Abdominal:      General: Abdomen is flat. Bowel sounds are normal.      Palpations: Abdomen is soft.   Musculoskeletal:      Cervical back: Normal range of motion and neck supple.      Comments: Moves all extremities   Skin:     General: Skin is warm and dry.      Capillary Refill: Capillary refill takes less than 2 seconds.   Neurological:      General: No focal deficit present.      Mental Status: She is alert and oriented to person, place, and time.   Psychiatric:         Mood and Affect: Mood normal.         Behavior: Behavior normal.              Results Reviewed:  Lab Results (last 24 hours)       Procedure Component Value Units Date/Time    Blood Culture - Blood, Arm, Left [[id number]] Collected: 10/16/23 1240    Specimen: Blood " "from Arm, Left Updated: 10/16/23 1247    Urinalysis With Culture If Indicated - Straight Cath [[id number]]  (Abnormal) Collected: 10/16/23 1223    Specimen: Urine from Straight Cath Updated: 10/16/23 1241     Color, UA Dark Yellow     Appearance, UA Turbid     pH, UA 8.5     Specific Gravity, UA 1.019     Glucose, UA Negative     Ketones, UA Trace     Bilirubin, UA Negative     Blood, UA Trace     Protein, UA >=300 mg/dL (3+)     Leuk Esterase, UA Large (3+)     Nitrite, UA Positive     Urobilinogen, UA 1.0 E.U./dL    Narrative:      In absence of clinical symptoms, the presence of pyuria, bacteria, and/or nitrites on the urinalysis result does not correlate with infection.    Urinalysis, Microscopic Only - Straight Cath [[id number]]  (Abnormal) Collected: 10/16/23 1223    Specimen: Urine from Straight Cath Updated: 10/16/23 1241     RBC, UA 6-10 /HPF      WBC, UA Too Numerous to Count /HPF      Bacteria, UA 4+ /HPF      Squamous Epithelial Cells, UA 3-6 /HPF      Methodology Manual Light Microscopy    Urine Culture - Urine, Straight Cath [[id number]] Collected: 10/16/23 1223    Specimen: Urine from Straight Cath Updated: 10/16/23 1241    Procalcitonin [[id number]]  (Normal) Collected: 10/16/23 1203    Specimen: Blood Updated: 10/16/23 1237     Procalcitonin 0.06 ng/mL     Narrative:      As a Marker for Sepsis (Non-Neonates):    1. <0.5 ng/mL represents a low risk of severe sepsis and/or septic shock.  2. >2 ng/mL represents a high risk of severe sepsis and/or septic shock.    As a Marker for Lower Respiratory Tract Infections that require antibiotic therapy:    PCT on Admission    Antibiotic Therapy       6-12 Hrs later    >0.5                Strongly Recommended  >0.25 - <0.5        Recommended   0.1 - 0.25          Discouraged              Remeasure/reassess PCT  <0.1                Strongly Discouraged     Remeasure/reassess PCT    As 28 day mortality risk marker: \"Change in Procalcitonin Result\" (>80% or <=80%) if " Day 0 (or Day 1) and Day 4 values are available. Refer to http://www.[facility]-pct-calculator.com    Change in PCT <=80%  A decrease of PCT levels below or equal to 80% defines a positive change in PCT test result representing a higher risk for 28-day all-cause mortality of patients diagnosed with severe sepsis for septic shock.    Change in PCT >80%  A decrease of PCT levels of more than 80% defines a negative change in PCT result representing a lower risk for 28-day all-cause mortality of patients diagnosed with severe sepsis or septic shock.       Comprehensive Metabolic Panel [[id number]]  (Abnormal) Collected: 10/16/23 1203    Specimen: Blood Updated: 10/16/23 1231     Glucose 103 mg/dL      BUN 21 mg/dL      Creatinine 0.89 mg/dL      Sodium 140 mmol/L      Potassium 4.1 mmol/L      Comment: Slight hemolysis detected by analyzer. Results may be affected.        Chloride 105 mmol/L      CO2 25.0 mmol/L      Calcium 9.4 mg/dL      Total Protein 6.5 g/dL      Albumin 3.7 g/dL      ALT (SGPT) 8 U/L      AST (SGOT) 11 U/L      Alkaline Phosphatase 114 U/L      Total Bilirubin 0.5 mg/dL      Globulin 2.8 gm/dL      A/G Ratio 1.3 g/dL      BUN/Creatinine Ratio 23.6     Anion Gap 10.0 mmol/L      eGFR 63.2 mL/min/1.73     Narrative:      GFR Normal >60  Chronic Kidney Disease <60  Kidney Failure <15    The GFR formula is only valid for adults with stable renal function between ages 18 and 70.    Magnesium [[id number]]  (Normal) Collected: 10/16/23 1203    Specimen: Blood Updated: 10/16/23 1231     Magnesium 2.1 mg/dL     Lactic Acid, Plasma [[id number]]  (Normal) Collected: 10/16/23 1203    Specimen: Blood Updated: 10/16/23 1229     Lactate 1.0 mmol/L     CBC & Differential [[id number]]  (Abnormal) Collected: 10/16/23 1203    Specimen: Blood Updated: 10/16/23 1212    Narrative:      The following orders were created for panel order CBC & Differential.  Procedure                               Abnormality         Status                      ---------                               -----------         ------                     CBC Auto Differential[[id number]]        Abnormal            Final result                 Please view results for these tests on the individual orders.    CBC Auto Differential [[id number]]  (Abnormal) Collected: 10/16/23 1203    Specimen: Blood Updated: 10/16/23 1212     WBC 5.20 10*3/mm3      RBC 4.46 10*6/mm3      Hemoglobin 13.0 g/dL      Hematocrit 39.9 %      MCV 89.5 fL      MCH 29.1 pg      MCHC 32.6 g/dL      RDW 13.2 %      RDW-SD 42.7 fl      MPV 8.6 fL      Platelets 397 10*3/mm3      Neutrophil % 55.1 %      Lymphocyte % 30.0 %      Monocyte % 9.4 %      Eosinophil % 3.5 %      Basophil % 1.2 %      Immature Grans % 0.8 %      Neutrophils, Absolute 2.87 10*3/mm3      Lymphocytes, Absolute 1.56 10*3/mm3      Monocytes, Absolute 0.49 10*3/mm3      Eosinophils, Absolute 0.18 10*3/mm3      Basophils, Absolute 0.06 10*3/mm3      Immature Grans, Absolute 0.04 10*3/mm3      nRBC 0.0 /100 WBC     Blood Culture - Blood, Arm, Right [[id number]] Collected: 10/16/23 1203    Specimen: Blood from Arm, Right Updated: 10/16/23 1210          Imaging Results (Last 24 Hours)       Procedure Component Value Units Date/Time    XR Chest 1 View [[id number]] Collected: 10/16/23 1248     Updated: 10/16/23 1254    Narrative:      EXAMINATION: XR CHEST 1 VW-     10/16/2023 12:38 PM CDT     HISTORY: Weakness     COMPARISON:  3/21/2021.     1 view chest x-ray.     Heart size is normal.  Aortic arch calcification.  The mediastinum is within normal limits.     The lungs are normally expanded with no pneumonia or pneumothorax.     No congestive failure changes.     Osteopenic bones.  High-grade degenerative change at each shoulder.       Impression:      1. No acute lung disease.           This report was signed and finalized on 10/16/2023 12:51 PM CDT by Dr. Leonardo Cutler MD.             I have personally reviewed and interpreted the  radiology studies and ECG obtained at time of admission.     Assessment / Plan   Assessment:   Active Hospital Problems    Diagnosis     **Decreased activities of daily living (ADL)     Urinary tract infection without hematuria     Hypertension        Treatment Plan  The patient will be admitted to my service here at [facility].   IV fluids normal saline 50 cc an hour  Rocephin 1 g IV every 24 hours  PT OT eval and treat  Nursing to dress wounds daily  CBC CMP in a.m.  Chair for meals  Resume home meds, give her today's doses as she did not get them prior to coming to the hospital.      Medical Decision Making  Number and Complexity of problems:   Decreased activities of daily living moderate complexity  Urinary tract infection moderate complexity  Hypertension moderate complexity    Differential Diagnosis: Gait instability secondary to Parkinson's    Conditions and Status        Unchanged     MDM Data  External documents reviewed: No documents to review  Cardiac tracing (EKG, telemetry) interpretation: Reviewed  Radiology interpretation: Reviewed  Labs reviewed: Reviewed  Any tests that were considered but not ordered: None     Decision rules/scores evaluated (example [id number], Wells, etc): None     Discussed with: Patient and family member at bedside     Care Planning  Shared decision making: Patient and family member at bedside  Code status and discussions: Full code intubate if needed    Disposition  Social Determinants of Health that impact treatment or disposition: None  Estimated length of stay is 2 to 3 days.     I confirmed that the patient's advanced care plan is present, code status is documented, and a surrogate decision maker is listed in the patient's medical record.     The patient's surrogate decision maker is .     The patient was seen and examined by me on 10/16/2023 at 1445.    Electronically signed by Angela Mcneill, 10/16/23, 15:12 CDT.

## 2023-10-16 NOTE — PLAN OF CARE
Problem: Skin Injury Risk Increased  Goal: Skin Health and Integrity  10/16/2023 1536 by Danitza Uribe, YARA  Outcome: Ongoing, Progressing  10/16/2023 1535 by Danitza Uribe RN  Outcome: Ongoing, Progressing     Problem: Fall Injury Risk  Goal: Absence of Fall and Fall-Related Injury  10/16/2023 1536 by Danitza Uribe, YARA  Outcome: Ongoing, Progressing  10/16/2023 1535 by Danitza Uribe RN  Outcome: Ongoing, Progressing  Intervention: Promote Injury-Free Environment  Recent Flowsheet Documentation  Taken 10/16/2023 1524 by Danitza Uribe RN  Safety Promotion/Fall Prevention: safety round/check completed     Problem: UTI (Urinary Tract Infection)  Goal: Improved Infection Symptoms  10/16/2023 1536 by Danitza Uribe RN  Outcome: Ongoing, Progressing  10/16/2023 1535 by Danitza Uribe RN  Outcome: Ongoing, Progressing   Goal Outcome Evaluation:         Pt admitted from ER with Increased weakness, frequent fall and altered mental status. Diagnosed with UTI. Son, at bedside reports that pt has dementia at baseline at home, but confusion has gotten worse over the past couple of days. Pt was soaked in urine when arrived from the ER. Incont. Care was complete. Pt is disoriented to place, time and situation. Pt has been followed by [facility] for wounds to left wrist and right knee s/p fall. Photos taken and updated in epic. Bed alarm on. Safety maintained.

## 2023-10-16 NOTE — ED NOTES
"Nursing report ED to floor  Kim Davison  87 y.o.  female    HPI:   Chief Complaint   Patient presents with    Wound Infection    Difficulty Walking       Admitting doctor:   Angela Mcneill    Consulting provider(s):  Consults       No orders found from 9/17/2023 to 10/17/2023.             Admitting diagnosis:   The primary encounter diagnosis was Decreased activities of daily living (ADL). Diagnoses of Confusion, Acute UTI (urinary tract infection), and Wound of left lower extremity, sequela were also pertinent to this visit.    Code status:   Current Code Status       Date Active Code Status Order ID Comments User Context       Not on file            Allergies:   Patient has no known allergies.    Intake and Output  No intake or output data in the 24 hours ending 10/16/23 1442    Weight:       10/16/23  1135   Weight: 53.5 kg (118 lb)       Most recent vitals:   Vitals:    10/16/23 1135 10/16/23 1418 10/16/23 1431   BP: 179/77 (!) 194/87 (!) 189/70   BP Location: Right arm     Patient Position: Sitting     Pulse: 72 73 72   Resp: 16     Temp: 97.9 °F (36.6 °C)     TempSrc: Temporal     SpO2: 99% 99% 96%   Weight: 53.5 kg (118 lb)     Height: 160 cm (63\")       Oxygen Therapy: .    Active LDAs/IV Access:   Lines, Drains & Airways       Active LDAs       Name Placement date Placement time Site Days    Peripheral IV 10/16/23 1201 Right Antecubital 10/16/23  1201  Antecubital  less than 1                    Labs (abnormal labs have a star):   Labs Reviewed   COMPREHENSIVE METABOLIC PANEL - Abnormal; Notable for the following components:       Result Value    Glucose 103 (*)     All other components within normal limits    Narrative:     GFR Normal >60  Chronic Kidney Disease <60  Kidney Failure <15    The GFR formula is only valid for adults with stable renal function between ages 18 and 70.   URINALYSIS W/ CULTURE IF INDICATED - Abnormal; Notable for the following components:    Color, UA Dark Yellow (*)     " "Appearance, UA Turbid (*)     pH, UA 8.5 (*)     Ketones, UA Trace (*)     Blood, UA Trace (*)     Protein, UA >=300 mg/dL (3+) (*)     Leuk Esterase, UA Large (3+) (*)     Nitrite, UA Positive (*)     All other components within normal limits    Narrative:     In absence of clinical symptoms, the presence of pyuria, bacteria, and/or nitrites on the urinalysis result does not correlate with infection.   CBC WITH AUTO DIFFERENTIAL - Abnormal; Notable for the following components:    Immature Grans % 0.8 (*)     All other components within normal limits   URINALYSIS, MICROSCOPIC ONLY - Abnormal; Notable for the following components:    RBC, UA 6-10 (*)     WBC, UA Too Numerous to Count (*)     Bacteria, UA 4+ (*)     Squamous Epithelial Cells, UA 3-6 (*)     All other components within normal limits   LACTIC ACID, PLASMA - Normal   PROCALCITONIN - Normal    Narrative:     As a Marker for Sepsis (Non-Neonates):    1. <0.5 ng/mL represents a low risk of severe sepsis and/or septic shock.  2. >2 ng/mL represents a high risk of severe sepsis and/or septic shock.    As a Marker for Lower Respiratory Tract Infections that require antibiotic therapy:    PCT on Admission    Antibiotic Therapy       6-12 Hrs later    >0.5                Strongly Recommended  >0.25 - <0.5        Recommended   0.1 - 0.25          Discouraged              Remeasure/reassess PCT  <0.1                Strongly Discouraged     Remeasure/reassess PCT    As 28 day mortality risk marker: \"Change in Procalcitonin Result\" (>80% or <=80%) if Day 0 (or Day 1) and Day 4 values are available. Refer to http://www.[facility]-pct-calculator.com    Change in PCT <=80%  A decrease of PCT levels below or equal to 80% defines a positive change in PCT test result representing a higher risk for 28-day all-cause mortality of patients diagnosed with severe sepsis for septic shock.    Change in PCT >80%  A decrease of PCT levels of more than 80% defines a negative change in " PCT result representing a lower risk for 28-day all-cause mortality of patients diagnosed with severe sepsis or septic shock.      MAGNESIUM - Normal   BLOOD CULTURE   BLOOD CULTURE   URINE CULTURE   CBC AND DIFFERENTIAL    Narrative:     The following orders were created for panel order CBC & Differential.  Procedure                               Abnormality         Status                     ---------                               -----------         ------                     CBC Auto Differential[[id number]]        Abnormal            Final result                 Please view results for these tests on the individual orders.       Meds given in ED:   Medications   sodium chloride 0.9 % flush 10 mL (has no administration in time range)   cefTRIAXone (ROCEPHIN) 1,000 mg in sodium chloride 0.9 % 100 mL IVPB (1,000 mg Intravenous New Bag 10/16/23 1418)           NIH Stroke Scale:       Isolation/Infection(s):  No active isolations   No active infections     COVID Testing  Collected .  Resulted .    Nursing report ED to floor:  Mental status: .  Ambulatory status: .  Precautions: .    ED nurse phone extentsion- ..

## 2023-10-16 NOTE — ED PROVIDER NOTES
Subjective   History of Present Illness  Patient is 87-year-old lady who came the ED brought by the son with decreased ADLs chronic wound of the left lower extremity and difficulty walking because of weakness.  There is no focal neurological deficits.  Patient appears to be demented.    Wound Infection  Location:  Left lower extremity wound infection which is chronic  Associated symptoms: no abdominal pain, no chest pain, no congestion, no headaches, no loss of consciousness, no rhinorrhea, no shortness of breath and no sore throat    Difficulty Walking  Location:  Increase daily activity  Severity:  Moderate  Onset quality:  Gradual  Duration:  2 days  Timing:  Constant  Progression:  Worsening  Associated symptoms: no abdominal pain, no chest pain, no congestion, no headaches, no loss of consciousness, no rhinorrhea, no shortness of breath and no sore throat        Review of Systems   Constitutional: Negative.    HENT: Negative.  Negative for congestion, rhinorrhea and sore throat.    Eyes: Negative.    Respiratory: Negative.  Negative for shortness of breath.    Cardiovascular: Negative.  Negative for chest pain.   Gastrointestinal: Negative.  Negative for abdominal pain.   Musculoskeletal: Negative.  Negative for back pain and neck pain.   Skin: Negative.    Neurological: Negative.  Negative for loss of consciousness and headaches.   All other systems reviewed and are negative.      Past Medical History:   Diagnosis Date    Abnormal EKG 2/1/2019    Atrioventricular block, Mobitz type 1, Wenckebach     Heart murmur     Hyperglycemia     Hypertension     Insomnia     Neuropathy     Orthostasis     Skin cancer     unknown what type    SVT (supraventricular tachycardia)     Syncope        No Known Allergies    Past Surgical History:   Procedure Laterality Date    APPENDECTOMY  03/20/2007    BREAST BIOPSY      CATARACT EXTRACTION      with lens implants    HEMORRHOIDECTOMY  03/20/2007    LUMBAR SPINE SURGERY  2002     SKIN CANCER EXCISION         Family History   Problem Relation Age of Onset    Stomach cancer Mother     Heart attack Father     Lymphoma Sister     Heart disease Brother     Lung cancer Son        Social History     Socioeconomic History    Marital status:    Tobacco Use    Smoking status: Every Day     Packs/day: 0.25     Years: 60.00     Additional pack years: 0.00     Total pack years: 15.00     Types: Cigarettes     Start date: 1956    Smokeless tobacco: Never    Tobacco comments:     smoking 4-5 cigs per day (had been 1-3 per day at last visit), smoked less than 1/2 ppd for years - only able to completely quit for 2-3 months at a time, quit several times   Vaping Use    Vaping Use: Never used   Substance and Sexual Activity    Alcohol use: No    Drug use: No    Sexual activity: Defer           Objective   Physical Exam  Vitals and nursing note reviewed. Exam conducted with a chaperone present.   Constitutional:       General: She is awake.      Appearance: She is well-developed and underweight. She is ill-appearing.   HENT:      Head: Normocephalic and atraumatic.   Eyes:      General: Lids are normal. No visual field deficit.     Conjunctiva/sclera: Conjunctivae normal.      Pupils: Pupils are equal, round, and reactive to light.   Cardiovascular:      Rate and Rhythm: Normal rate and regular rhythm.      Chest Wall: PMI is not displaced.      Pulses: Normal pulses.      Heart sounds: Normal heart sounds.   Pulmonary:      Effort: Pulmonary effort is normal.      Breath sounds: Normal breath sounds. No decreased breath sounds.   Abdominal:      General: Abdomen is flat. Bowel sounds are normal.      Palpations: Abdomen is soft.      Tenderness: There is no abdominal tenderness.   Musculoskeletal:         General: Normal range of motion.      Cervical back: Normal, full passive range of motion without pain, normal range of motion and neck supple. No swelling, edema, tenderness or bony tenderness.       Thoracic back: Normal. No deformity, lacerations or spasms.      Lumbar back: Normal. No lacerations, spasms or tenderness.   Skin:     General: Skin is warm and dry.      Capillary Refill: Capillary refill takes 2 to 3 seconds.      Coloration: Skin is pale and sallow.   Neurological:      General: No focal deficit present.      Mental Status: She is alert. Mental status is at baseline. She is disoriented and confused.      GCS: GCS eye subscore is 4. GCS verbal subscore is 4. GCS motor subscore is 5.      Cranial Nerves: Cranial nerves 2-12 are intact. No cranial nerve deficit, dysarthria or facial asymmetry.      Motor: Motor function is intact. Weakness present.      Deep Tendon Reflexes: Reflexes are normal and symmetric.      Comments: Nonfocal exam weakness all over patient not able to get out of the bed or walk on her own because of weakness that she is suffering from but is nonfocal.   Psychiatric:         Behavior: Behavior is cooperative.         Procedures           ED Course  ED Course as of 10/16/23 1433   Mon Oct 16, 2023   1213 nsr [TS]   1331 Patient with generalized weakness has got a UTI rest of lab work-up essentially looks within normal limits.  But she got this decreased ADL she lives at home with her elderly  and is not able to take care of herself.  This would not be a safe discharge will admit to the medicine service then. [TS]   1468 Discussed with the hospitalist they wanted a CT to be performed on the patient's head they are going to follow-up on the CT scan report [TS]      ED Course User Index  [TS] Oswaldo Guerin MD                                           Medical Decision Making  Generalized weakness differential could be neuromuscular weakness which could be upper motor neuron versus lower motor neuron including CVAs strokes spinal cord problems spinal cord diseases like infection trauma inflammation etc.  Other things that can cause generalized weakness are  peripheral nerve  disease with toxins ,neuropathies, and endocrine abnormalities.  Muscle diseases like dermatomyositis rhabdomyolysis and alcoholic myopathy can give rise to weakness  Non  neuromuscular diseases like coronary syndromes, arrhythmias, infection, hypoglycemia, thyroid disease and respiratory failure can cause generalized weakness also hypokalemia, hypomagnesemia, hypo or hypernatremia ,polypharmacy ,hypothyroidism and malignancies can cause generalized weakness      Problems Addressed:  Acute UTI (urinary tract infection): complicated acute illness or injury     Details: With some confusion.  Was given IV Rocephin.  Confusion: complicated acute illness or injury     Details: The patient got underlying dementia and that has gotten worse and with a UTI.  I do not see any focal neurological lateralizing symptoms.  Will be admitted to the medicine service since she is not a safe discharge  Decreased activities of daily living (ADL): complicated acute illness or injury     Details: Increase daily activities and able to take care of herself may require nursing home placement.  Wound of left lower extremity, sequela: chronic illness or injury     Details: No evidence of any acute cellulitis.    Amount and/or Complexity of Data Reviewed  Labs: ordered.     Details: Labs reviewed  Radiology: ordered.  ECG/medicine tests: ordered.  Discussion of management or test interpretation with external provider(s): Discussed with     Risk  Prescription drug management.  Decision regarding hospitalization.  Risk Details: Patient will be admitted to the medicine service.        Final diagnoses:   Decreased activities of daily living (ADL)   Confusion   Acute UTI (urinary tract infection)   Wound of left lower extremity, sequela       ED Disposition  ED Disposition       ED Disposition   Decision to Admit    Condition   --    Comment   Level of Care: Med/Surg [1]   Diagnosis: Decreased activities of daily living (ADL) [9335613]    Admitting Physician: JOSE G ASHRAF [[id number]]   Attending Physician: JOSE G ASHRAF [[id number]]   Certification: I Certify That Inpatient Hospital Services Are Medically Necessary For Greater Than 2 Midnights                 No follow-up provider specified.       Medication List      No changes were made to your prescriptions during this visit.            Oswaldo Guerin MD  10/16/23 1213       Oswaldo Guerin MD  10/16/23 1417       Oswaldo Guerin MD  10/16/23 1432       Oswaldo Guerin MD  10/16/23 1433

## 2023-10-17 ENCOUNTER — APPOINTMENT (OUTPATIENT)
Dept: CT IMAGING | Facility: HOSPITAL | Age: 88
DRG: 690 | End: 2023-10-17
Payer: MEDICARE

## 2023-10-17 LAB
ALBUMIN SERPL-MCNC: 3.3 G/DL (ref 3.5–5.2)
ALBUMIN/GLOB SERPL: 1.2 G/DL
ALP SERPL-CCNC: 108 U/L (ref 39–117)
ALT SERPL W P-5'-P-CCNC: 7 U/L (ref 1–33)
ANION GAP SERPL CALCULATED.3IONS-SCNC: 11 MMOL/L (ref 5–15)
AST SERPL-CCNC: 10 U/L (ref 1–32)
BASOPHILS # BLD AUTO: 0.05 10*3/MM3 (ref 0–0.2)
BASOPHILS NFR BLD AUTO: 0.9 % (ref 0–1.5)
BILIRUB SERPL-MCNC: 0.6 MG/DL (ref 0–1.2)
BUN SERPL-MCNC: 13 MG/DL (ref 8–23)
BUN/CREAT SERPL: 22 (ref 7–25)
CALCIUM SPEC-SCNC: 8.9 MG/DL (ref 8.6–10.5)
CHLORIDE SERPL-SCNC: 104 MMOL/L (ref 98–107)
CO2 SERPL-SCNC: 22 MMOL/L (ref 22–29)
CREAT SERPL-MCNC: 0.59 MG/DL (ref 0.57–1)
DEPRECATED RDW RBC AUTO: 41.6 FL (ref 37–54)
EGFRCR SERPLBLD CKD-EPI 2021: 87.9 ML/MIN/1.73
EOSINOPHIL # BLD AUTO: 0.23 10*3/MM3 (ref 0–0.4)
EOSINOPHIL NFR BLD AUTO: 4 % (ref 0.3–6.2)
ERYTHROCYTE [DISTWIDTH] IN BLOOD BY AUTOMATED COUNT: 13 % (ref 12.3–15.4)
GLOBULIN UR ELPH-MCNC: 2.8 GM/DL
GLUCOSE SERPL-MCNC: 106 MG/DL (ref 65–99)
HCT VFR BLD AUTO: 37.4 % (ref 34–46.6)
HGB BLD-MCNC: 12.5 G/DL (ref 12–15.9)
IMM GRANULOCYTES # BLD AUTO: 0.05 10*3/MM3 (ref 0–0.05)
IMM GRANULOCYTES NFR BLD AUTO: 0.9 % (ref 0–0.5)
LYMPHOCYTES # BLD AUTO: 1.65 10*3/MM3 (ref 0.7–3.1)
LYMPHOCYTES NFR BLD AUTO: 29 % (ref 19.6–45.3)
MCH RBC QN AUTO: 29.1 PG (ref 26.6–33)
MCHC RBC AUTO-ENTMCNC: 33.4 G/DL (ref 31.5–35.7)
MCV RBC AUTO: 87.2 FL (ref 79–97)
MONOCYTES # BLD AUTO: 0.53 10*3/MM3 (ref 0.1–0.9)
MONOCYTES NFR BLD AUTO: 9.3 % (ref 5–12)
NEUTROPHILS NFR BLD AUTO: 3.17 10*3/MM3 (ref 1.7–7)
NEUTROPHILS NFR BLD AUTO: 55.9 % (ref 42.7–76)
NRBC BLD AUTO-RTO: 0 /100 WBC (ref 0–0.2)
PLATELET # BLD AUTO: 386 10*3/MM3 (ref 140–450)
PMV BLD AUTO: 8.5 FL (ref 6–12)
POTASSIUM SERPL-SCNC: 3.6 MMOL/L (ref 3.5–5.2)
PROT SERPL-MCNC: 6.1 G/DL (ref 6–8.5)
RBC # BLD AUTO: 4.29 10*6/MM3 (ref 3.77–5.28)
SODIUM SERPL-SCNC: 137 MMOL/L (ref 136–145)
WBC NRBC COR # BLD: 5.68 10*3/MM3 (ref 3.4–10.8)

## 2023-10-17 PROCEDURE — 97116 GAIT TRAINING THERAPY: CPT

## 2023-10-17 PROCEDURE — 85025 COMPLETE CBC W/AUTO DIFF WBC: CPT | Performed by: FAMILY MEDICINE

## 2023-10-17 PROCEDURE — 25010000002 CEFTRIAXONE PER 250 MG: Performed by: FAMILY MEDICINE

## 2023-10-17 PROCEDURE — 97166 OT EVAL MOD COMPLEX 45 MIN: CPT

## 2023-10-17 PROCEDURE — 97162 PT EVAL MOD COMPLEX 30 MIN: CPT

## 2023-10-17 PROCEDURE — 25010000002 SODIUM CHLORIDE 0.9 % WITH KCL 20 MEQ 20-0.9 MEQ/L-% SOLUTION: Performed by: FAMILY MEDICINE

## 2023-10-17 PROCEDURE — 70450 CT HEAD/BRAIN W/O DYE: CPT

## 2023-10-17 PROCEDURE — 36415 COLL VENOUS BLD VENIPUNCTURE: CPT | Performed by: FAMILY MEDICINE

## 2023-10-17 PROCEDURE — 80053 COMPREHEN METABOLIC PANEL: CPT | Performed by: FAMILY MEDICINE

## 2023-10-17 RX ORDER — ZOLPIDEM TARTRATE 5 MG/1
10 TABLET ORAL NIGHTLY PRN
Status: DISCONTINUED | OUTPATIENT
Start: 2023-10-17 | End: 2023-10-19 | Stop reason: HOSPADM

## 2023-10-17 RX ADMIN — Medication 1000 MCG: at 09:22

## 2023-10-17 RX ADMIN — MEGESTROL ACETATE 200 MG: 40 SUSPENSION ORAL at 09:26

## 2023-10-17 RX ADMIN — METOPROLOL TARTRATE 25 MG: 25 TABLET, FILM COATED ORAL at 09:22

## 2023-10-17 RX ADMIN — Medication 10 ML: at 22:37

## 2023-10-17 RX ADMIN — ZOLPIDEM TARTRATE 10 MG: 5 TABLET ORAL at 02:06

## 2023-10-17 RX ADMIN — PREGABALIN 75 MG: 75 CAPSULE ORAL at 09:21

## 2023-10-17 RX ADMIN — BUSPIRONE HYDROCHLORIDE 10 MG: 10 TABLET ORAL at 13:24

## 2023-10-17 RX ADMIN — BUSPIRONE HYDROCHLORIDE 10 MG: 10 TABLET ORAL at 22:36

## 2023-10-17 RX ADMIN — LISINOPRIL 20 MG: 20 TABLET ORAL at 09:23

## 2023-10-17 RX ADMIN — CITALOPRAM 10 MG: 10 TABLET, FILM COATED ORAL at 09:25

## 2023-10-17 RX ADMIN — METOPROLOL TARTRATE 25 MG: 25 TABLET, FILM COATED ORAL at 22:37

## 2023-10-17 RX ADMIN — BUSPIRONE HYDROCHLORIDE 10 MG: 10 TABLET ORAL at 05:53

## 2023-10-17 RX ADMIN — POTASSIUM CHLORIDE AND SODIUM CHLORIDE 50 ML/HR: 900; 150 INJECTION, SOLUTION INTRAVENOUS at 13:24

## 2023-10-17 RX ADMIN — PREGABALIN 75 MG: 75 CAPSULE ORAL at 22:37

## 2023-10-17 RX ADMIN — HYDROCHLOROTHIAZIDE 12.5 MG: 25 TABLET ORAL at 09:24

## 2023-10-17 RX ADMIN — Medication 10 ML: at 09:28

## 2023-10-17 RX ADMIN — SODIUM CHLORIDE 1000 MG: 900 INJECTION INTRAVENOUS at 13:23

## 2023-10-17 NOTE — PLAN OF CARE
"Goal Outcome Evaluation:  Plan of Care Reviewed With: patient        Progress: no change  Outcome Evaluation: OT eval complete. Pt alert & oriented to self, knows she is in the hospital. Pt reports being able to walk short distances at home but needing help from  for dressing- unsure of how much help. Pt reports she frequently falls and just \"loses her balance\" Pt modA for supine>sit, demos good sitting balance EOB. BUE ROM WFL, w/ exception of B shoulders, R shoulder significantly impaired due to arthritis. B biceps, triceps, &  3+/5. Simulated don/doff socks, anticipate needing mod-maxA. Pt incontinent of urine, modA to change brief and perform perineal hygiene. Pt sit>stand modA & amb to chair w/ rwx. Pt performed sit<>stand x3 and progressed from modA to M[place] on 3rd attempt. Pt left in chair eating breakfast w/ chair alarm. Pt demos deficits in balance,strength, & ROM putting her at risk for falls. She would benefit from skilled OT to address these benefits. Attempted to call son to gain more insight into level of assist at home. Due to increased fall risk, recommend SNF at d/c vs home w/ HH- will monitor pt progress to further assess d/c needs.         "

## 2023-10-17 NOTE — PLAN OF CARE
Goal Outcome Evaluation:      Continuing maintenance IV fluids and IV antibiotic- Rocephin. CT of head completed this morning. Pt working with physical therapy. Waiting on wound care nurse to assess wounds to L arm and R knee. Dalton. JEAN-PAUL. Family at bedside.                    Telephone Encounter by Ros Doherty CPT at 05/10/17 07:04 PM     Author:  Ros Doherty CPT Service:  (none) Author Type:  Technician     Filed:  05/10/17 07:04 PM Encounter Date:  5/10/2017 Status:  Signed     :  Ros Doherty CPT (Technician)       From: Jennifer Kumar  To: Dylan Kline OD  Sent: 5/10/2017  3:32 PM CDT  Subject: Other     Hi  Was wondering if my glasses are in?  Will be 2 weeks on Friday...  Thanks much for your time.  Jennifer Kumar       Revision History        Date/Time User Provider Type Action    > 05/10/17 07:04 PM Ros Doherty CPT Technician Sign    Attribution information within the note text is not available.

## 2023-10-17 NOTE — CASE MANAGEMENT/SOCIAL WORK
Discharge Planning Assessment  [facility]     Patient Name: Kim Davison  MRN: [id number]  Today's Date: 10/17/2023    Admit Date: 10/16/2023        Discharge Needs Assessment       Row Name 10/17/23 1449       Living Environment    People in Home spouse    Current Living Arrangements home    Potentially Unsafe Housing Conditions none    Primary Care Provided by other (see comments)    Provides Primary Care For no one    Family Caregiver if Needed spouse    Quality of Family Relationships helpful;involved;supportive    Able to Return to Prior Arrangements no       Resource/Environmental Concerns    Resource/Environmental Concerns none       Transition Planning    Patient/Family Anticipates Transition to inpatient rehabilitation facility    Patient/Family Anticipated Services at Transition skilled nursing    Transportation Anticipated family or friend will provide       Discharge Needs Assessment    Readmission Within the Last 30 Days no previous admission in last 30 days    Equipment Currently Used at Home shower chair;walker, rolling;commode;wheelchair    Concerns to be Addressed care coordination/care conferences;discharge planning    Anticipated Changes Related to Illness inability to care for self    Outpatient/Agency/Support Group Needs skilled nursing facility    Discharge Facility/Level of Care Needs nursing facility, skilled    Discharge Coordination/Progress Pt lives at home with her spouse. She is current with [facility], which was confirmed with Maria [phone]. He does not think he will be able to take care of her at d/c and she will need rehab. He is wanting her to be in the [place] area. Referral sent to [facility] at [phone].                   Discharge Plan    No documentation.                 Continued Care and Services - Admitted Since 10/16/2023    Coordination has not been started for this encounter.          Demographic Summary    No documentation.                  Functional Status    No  documentation.                  Psychosocial    No documentation.                  Abuse/Neglect    No documentation.                  Legal    No documentation.                  Substance Abuse    No documentation.                  Patient Forms    No documentation.                     [employer]

## 2023-10-17 NOTE — PLAN OF CARE
Goal Outcome Evaluation:           Progress: no change     Initial nutrition assessment. Pt presented to the ED d/t increased weakness with falls and UTI. She lives at home with her spouse. She is ordered a regular diet. She has consumed 67% of the last 3 meals. Pt has skin tears to L arm and L leg and an abrasion to R knee. No recent weight loss noted per available weights. She does take megace for appetite stimulation. [facility]. She may benefit from a nutrition supplement if unable to maintain intake and weight. Will follow per protocol.

## 2023-10-17 NOTE — PLAN OF CARE
Goal Outcome Evaluation:  Plan of Care Reviewed With: patient        Progress: no change   Pt alert to person only.  Home meds reviewed at start of shift with son at bedside and B/P meds x 2 left off home med list.  MD notified due to pts increased B/P. New ordered noted.  Son reported pt was taking Ambien at home nightly for years and requested it be given while in hospital.  Pt attempted to get out of bed several times until Ambien ordered and given. IV fluids infusing to second IV due to pt bending arm and causing occlusion. Turned and cleaned as needed.  Pt repeatedly removed pulse OX.  Bed check on and safety maintained.

## 2023-10-17 NOTE — PROGRESS NOTES
[facility] Medicine Services  INPATIENT PROGRESS NOTE    Patient Name: Kim Davison  Date of Admission: 10/16/2023  Today's Date: 10/17/23  Length of Stay: 1  Primary Care Physician: Winifred Kramer APRN    Subjective   Chief Complaint: Weakness  HPI   Ms. Davison is an 87-year-old female who presented to [facility] on 10/16 increased falls, urinary urgency and dysuria.  She lives at home with her spouse.  She normally uses a walker to ambulate.  Over the last 2 to 3 weeks she has had increasing difficulty with walking and has been having increased falls.  The ED, she was found to have an acute urinary tract infection.  Blood pressure as high as 206/80.  CT head interpreted by radiology showed no acute findings.  Chest x-ray interpreted by radiology showed no acute findings.    Sitting up in chair with multiple family present bedside.  She tells me that she is feeling well today.  She continues on IV ceftriaxone for gram-negative UTI.  She lives at home with her spouse.  She worked with OT earlier.  OT recommends discharge to skilled nursing facility due to increased fall risk.  Patient and spouse live in [place], they are agreeable to rehab in [place].    Review of Systems   All pertinent negatives and positives are as above. All other systems have been reviewed and are negative unless otherwise stated.     Objective    Temp:  [97.6 °F (36.4 °C)-98.5 °F (36.9 °C)] 98.5 °F (36.9 °C)  Heart Rate:  [68-79] 76  Resp:  [17-18] 17  BP: (164-206)/(70-96) 164/82  Physical Exam  Vitals reviewed.   Constitutional:       General: She is not in acute distress.     Appearance: She is not toxic-appearing.      Comments: Sitting up in chair.  No acute distress.  On room air.  Family members at bedside.   HENT:      Head: Normocephalic and atraumatic.      Mouth/Throat:      Mouth: Mucous membranes are moist.      Pharynx: Oropharynx is clear.   Eyes:      Extraocular Movements:  "Extraocular movements intact.      Conjunctiva/sclera: Conjunctivae normal.      Pupils: Pupils are equal, round, and reactive to light.   Cardiovascular:      Rate and Rhythm: Normal rate and regular rhythm.      Pulses: Normal pulses.   Pulmonary:      Effort: Pulmonary effort is normal. No respiratory distress.      Breath sounds: Normal breath sounds.   Abdominal:      General: Bowel sounds are normal. There is no distension.      Palpations: Abdomen is soft.      Tenderness: There is no abdominal tenderness.   Musculoskeletal:         General: No swelling or tenderness. Normal range of motion.      Cervical back: Normal range of motion and neck supple. No muscular tenderness.   Skin:     General: Skin is warm and dry.      Findings: No erythema or rash.   Neurological:      General: No focal deficit present.      Mental Status: She is alert.      Cranial Nerves: No cranial nerve deficit.      Motor: No weakness.      Comments: Oriented to self and knows that she is in the hospital.   Psychiatric:         Mood and Affect: Mood normal.         Behavior: Behavior normal.       Results Review:  I have reviewed the labs, radiology results, and diagnostic studies.    Laboratory Data:   Results from last 7 days   Lab Units 10/17/23  0552 10/16/23  1203   WBC 10*3/mm3 5.68 5.20   HEMOGLOBIN g/dL 12.5 13.0   HEMATOCRIT % 37.4 39.9   PLATELETS 10*3/mm3 386 397        Results from last 7 days   Lab Units 10/17/23  0552 10/16/23  1203   SODIUM mmol/L 137 140   POTASSIUM mmol/L 3.6 4.1   CHLORIDE mmol/L 104 105   CO2 mmol/L 22.0 25.0   BUN mg/dL 13 21   CREATININE mg/dL 0.59 0.89   CALCIUM mg/dL 8.9 9.4   BILIRUBIN mg/dL 0.6 0.5   ALK PHOS U/L 108 114   ALT (SGPT) U/L 7 8   AST (SGOT) U/L 10 11   GLUCOSE mg/dL 106* 103*       Culture Data:   Blood Culture   Date Value Ref Range Status   10/16/2023 No growth at 24 hours  Preliminary     No results found for: \"BCIDPCR\", \"CXREFLEX\", \"CSFCX\", \"CULTURETIS\"  No results found for: " "\"CULTURES\", \"HSVCX\", \"URCX\"  No results found for: \"EYECULTURE\", \"GCCX\", \"HSVCULTURE\", \"LABHSV\"  No results found for: \"LEGIONELLA\", \"MRSACX\", \"MUMPSCX\", \"MYCOPLASCX\"  No results found for: \"NOCARDIACX\", \"STOOLCX\"  Urine Culture   Date Value Ref Range Status   10/16/2023 >100,000 CFU/mL Gram Negative Bacilli (A)  Preliminary     No results found for: \"VIRALCULTU\", \"WOUNDCX\"    Radiology Data:   Imaging Results (Last 24 Hours)       Procedure Component Value Units Date/Time    CT Head Without Contrast [[id number]] Collected: 10/17/23 0808     Updated: 10/17/23 0815    Narrative:      EXAMINATION: CT HEAD WO CONTRAST-      10/17/2023 7:58 AM CDT     HISTORY: Mental status change, unknown cause; Z78.9-Other specified  health status; R41.0-Disorientation, unspecified; N39.0-Urinary tract  infection, site not specified; S81.802S-Unspecified open wound, left  lower leg, sequela     In order to have a CT radiation dose as low as reasonably achievable  Automated Exposure Control was utilized for adjustment of the mA and/or  KV according to patient size.     DLP in mGycm= 614     The CT scan of the head is performed without intravenous contrast  enhancement.     Images are acquired in axial plane and subsequent reconstruction in  coronal and sagittal planes.     There is no previous similar study for comparison.     There is no evidence of a mass. There is no midline shift.     There is no evidence of an intracranial hemorrhage or hematoma.     Significantly dilated ventricles are seen out of proportion to  moderately dilated basal cisterns and the cortical sulci which may  suggest an element of hydrocephalus in addition to chronic atrophy.     Scattered areas of chronic ischemia in the white matter bilaterally. The  gray-white matter differentiation is maintained.     There is heavy calcification of the anterior falx.     The images reviewed in bone window show no evidence of a skull fracture.  Limited visualized paranasal " sinuses and mastoid air cells are  unremarkable.       Impression:      1. No acute intracranial abnormality.  2. Chronic atrophy and communicating hydrocephalus.  3. 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                                This report was signed and finalized on 10/17/2023 8:12 AM CDT by Dr. Pratima Willis MD.       XR Chest 1 View [[id number]] Collected: 10/16/23 1248     Updated: 10/16/23 1254    Narrative:      EXAMINATION: XR CHEST 1 VW-     10/16/2023 12:38 PM CDT     HISTORY: Weakness     COMPARISON:  3/21/2021.     1 view chest x-ray.     Heart size is normal.  Aortic arch calcification.  The mediastinum is within normal limits.     The lungs are normally expanded with no pneumonia or pneumothorax.     No congestive failure changes.     Osteopenic bones.  High-grade degenerative change at each shoulder.       Impression:      1. No acute lung disease.           This report was signed and finalized on 10/16/2023 12:51 PM CDT by Dr. Leonardo Cutler MD.               I have reviewed the patient's current medications.     Assessment/Plan   Assessment  Active Hospital Problems    Diagnosis     **Decreased activities of daily living (ADL)     Urinary tract infection without hematuria     Hypertension        Treatment Plan  Urinary tract infection without hematuria.  Preliminary urine culture shows gram-negative bacilli.  Continue ceftriaxone.  Blood culture with no growth to date.  No fever, normal WBC and procalcitonin.    Blood pressure as high as 194/87 in ED.  Blood pressure this morning before medications 164/82.  Continue Lopressor, hydrochlorothiazide.  She was started on lisinopril 20 mg.  Monitor blood pressure trend.    Continue PT/OT.  Therapy recommends skilled nursing facility at discharge.    SCDs for DVT prophylaxis.    Medical Decision Making  Number and Complexity of problems:   Decreased activities of daily living, moderate  complexity  Urinary tract infection moderate, complexity  Hypertensive urgency, moderate complexity  Differential Diagnosis: Gait instability secondary to Parkinson's     Conditions and Status        Condition is improving.     [facility] Data  External documents reviewed: prior epic records  Cardiac tracing (EKG, telemetry) interpretation: Normal sinus rhythm  Radiology interpretation: Interpreted by radiology  Labs reviewed: As above  Any tests that were considered but not ordered: None considered at present     Decision rules/scores evaluated (example [id number], [place], etc): None considered at present     Discussed with: patient and Dr. Mcneill.     Care Planning  Shared decision making: Patient is agreeable to ongoing work-up and treatment  Code status and discussions: Full code with full interventions    Disposition  Social Determinants of Health that impact treatment or disposition: Patient lives with spouse in [place] -they are agreeable to rehab close to home  I expect the patient to be discharged to SNF in 1-2 days.     Electronically signed by RAVEN Walker, 10/17/23, 12:18 CDT.

## 2023-10-17 NOTE — THERAPY EVALUATION
Acute Care - Occupational Therapy Initial Evaluation  [facility]     Patient Name: Kim Davison  : 1935  MRN: [id number]  Today's Date: 10/17/2023  Onset of Illness/Injury or Date of Surgery: 10/16/23  Date of Referral to OT: 10/16/23  Referring Physician: Dr. Mcneill    Admit Date: 10/16/2023       ICD-10-CM ICD-9-CM   1. Decreased activities of daily living (ADL)  Z78.9 V49.89   2. Confusion  R41.0 298.9   3. Acute UTI (urinary tract infection)  N39.0 599.0   4. Wound of left lower extremity, sequela  S81.802S 906.1     Patient Active Problem List   Diagnosis    Palpitations    Abnormal EKG    Hypertension    Shortness of breath    Atrioventricular block, Mobitz type 1, Wenckebach    SVT (supraventricular tachycardia)    Orthostasis    Weight loss, non-intentional    Tobacco use    Decreased activities of daily living (ADL)    Urinary tract infection without hematuria     Past Medical History:   Diagnosis Date    Abnormal EKG 2019    Atrioventricular block, Mobitz type 1, Wenckebach     Heart murmur     Hyperglycemia     Hypertension     Insomnia     Neuropathy     Orthostasis     Skin cancer     unknown what type    SVT (supraventricular tachycardia)     Syncope      Past Surgical History:   Procedure Laterality Date    APPENDECTOMY  2007    BREAST BIOPSY      CATARACT EXTRACTION      with lens implants    HEMORRHOIDECTOMY  2007    LUMBAR SPINE SURGERY  2002    SKIN CANCER EXCISION           OT ASSESSMENT FLOWSHEET (last 12 hours)       OT Evaluation and Treatment       Row Name 10/17/23 0736                   OT Time and Intention    Subjective Information no complaints  -EC        Document Type evaluation  -EC        Mode of Treatment occupational therapy  -EC           General Information    Patient Profile Reviewed yes  -EC        Onset of Illness/Injury or Date of Surgery 10/16/23  -EC        Referring Physician Dr. Mcneill  -EC        Prior Level of Function  independent:;feeding;grooming;min assist:;transfer;mod assist:;dressing;bathing  -EC        Equipment Currently Used at Home walker, rolling;commode, bedside;wheelchair  -EC        Pertinent History of Current Functional Problem admit for decreased ADLs & fxnal mobility, frequent falls PMH: family reports dementia  -EC        Existing Precautions/Restrictions fall  -EC        Barriers to Rehab previous functional deficit  -EC           Living Environment    Current Living Arrangements home  -EC        Home Accessibility wheelchair accessible;other (see comments)  has a ramp  -EC        People in Home spouse  -EC           Pain Assessment    Pretreatment Pain Rating 0/10 - no pain  -EC        Posttreatment Pain Rating 0/10 - no pain  -EC        Pain Intervention(s) Repositioned;Ambulation/increased activity  -EC           Cognition    Orientation Status (Cognition) oriented to;person;verbal cues/prompts needed for orientation  knew she was in a hospital  -EC        Personal Safety Interventions fall prevention program maintained;gait belt;nonskid shoes/slippers when out of bed;supervised activity  -EC           Range of Motion Comprehensive    Comment, General Range of Motion L shoulder flexion 60%, R shoulder flexion 35%, all other BUE ROM WFL  -EC           Strength Comprehensive (MMT)    Comment, General Manual Muscle Testing (MMT) Assessment L shoulder 3-/5, B triceps, biceps, &  3+/5  -EC           Activities of Daily Living    BADL Assessment/Intervention lower body dressing;toileting  -EC           Lower Body Dressing Assessment/Training    Hall Level (Lower Body Dressing) doff;don;socks;other (see comments);moderate assist (50% patient effort);maximum assist (25% patient effort)  simulated, anticipate mod-maxA  -EC        Position (Lower Body Dressing) edge of bed sitting  -EC           Toileting Assessment/Training    Hall Level (Toileting) change pad/brief;moderate assist (50% patient  effort)  -EC        Position (Toileting) supported standing;unsupported sitting  -EC           BADL Safety/Performance    Impairments, BADL Safety/Performance balance;endurance/activity tolerance;range of motion;strength  -EC           Bed Mobility    Bed Mobility supine-sit  -EC        Supine-Sit [place] (Bed Mobility) moderate assist (50% patient effort);verbal cues  -EC        Assistive Device (Bed Mobility) bed rails;head of bed elevated  -EC           Functional Mobility    Functional Mobility- Ind. Level contact guard assist;verbal cues required  -EC        Functional Mobility- Device walker, front-wheeled  -EC        Functional Mobility- Comment amb to chair CGA using fww  -EC           Transfer Assessment/Treatment    Transfers sit-stand transfer;stand-sit transfer  -EC           Sit-Stand Transfer    Sit-Stand [place] (Transfers) moderate assist (50% patient effort);minimum assist (75% patient effort);verbal cues  -EC        Assistive Device (Sit-Stand Transfers) walker, front-wheeled  -EC        Comment, (Sit-Stand Transfer) vcs for hand placement  -EC           Stand-Sit Transfer    Stand-Sit [place] (Transfers) contact guard;verbal cues  -EC        Assistive Device (Stand-Sit Transfers) walker, front-wheeled  -EC        Comment, (Stand-Sit Transfer) vcs for hand placement  -EC           Safety Issues, Functional Mobility    Safety Issues Affecting Function (Mobility) friction/shear risk;impulsivity;insight into deficits/self-awareness;safety precaution awareness;safety precautions follow-through/compliance  -EC        Impairments Affecting Function (Mobility) balance;endurance/activity tolerance;range of motion (ROM);strength  -EC           Motor Skills    Motor Skills coordination  -EC        Coordination other (see comments)  finger opposition intact  -EC           Balance    Balance Assessment sitting static balance;sitting dynamic balance;standing static balance;standing dynamic  balance  -EC        Static Sitting Balance supervision  -EC        Dynamic Sitting Balance contact guard  -EC        Position, Sitting Balance sitting edge of bed  -EC        Static Standing Balance contact guard  -EC        Dynamic Standing Balance contact guard  -EC        Position/Device Used, Standing Balance supported;walker, front-wheeled  -EC           Wound 10/16/23 1529 Left distal arm Skin Tear    Wound - Properties Group Placement Date: 10/16/23  - Placement Time: 1529  -CH Present on Original Admission: Y  -CH Side: Left  -CH Orientation: distal  -CH Location: arm  -CH Primary Wound Type: Skin tear  -CH    Retired Wound - Properties Group Placement Date: 10/16/23  - Placement Time: 1529  -CH Present on Original Admission: Y  -CH Side: Left  -CH Orientation: distal  -CH Location: arm  -CH Primary Wound Type: Skin tear  -CH    Retired Wound - Properties Group Date first assessed: 10/16/23  - Time first assessed: 1529  -CH Present on Original Admission: Y  -CH Side: Left  -CH Location: arm  -CH Primary Wound Type: Skin tear  -CH       Wound 10/16/23 1529 Right anterior knee Abrasion    Wound - Properties Group Placement Date: 10/16/23  - Placement Time: 1529  -CH Side: Right  -CH Orientation: anterior  -CH Location: knee  -CH Primary Wound Type: Abrasion  -CH    Retired Wound - Properties Group Placement Date: 10/16/23  - Placement Time: 1529  -CH Side: Right  -CH Orientation: anterior  -CH Location: knee  -CH Primary Wound Type: Abrasion  -CH    Retired Wound - Properties Group Date first assessed: 10/16/23  - Time first assessed: 1529  -CH Side: Right  -CH Location: knee  -CH Primary Wound Type: Abrasion  -CH       Plan of Care Review    Plan of Care Reviewed With patient  -EC        Progress no change  -EC        Outcome Evaluation OT eval complete. Pt alert & oriented to self, knows she is in the hospital. Pt reports being able to walk short distances at home but needing help from   "for dressing- unsure of how much help. Pt reports she frequently falls and just \"loses her balance\" Pt modA for supine>sit, demos good sitting balance EOB. BUE ROM WFL, w/ exception of B shoulders, R shoulder significantly impaired due to arthritis. B biceps, triceps, &  3+/5. Simulated don/doff socks, anticipate needing mod-maxA. Pt incontinent of urine, modA to change brief and perform perineal hygiene. Pt sit>stand modA & amb to chair w/ rwx. Pt performed sit<>stand x3 and progressed from modA to [place] on 3rd attempt. Pt left in chair eating breakfast w/ chair alarm. Pt demos deficits in balance,strength, & ROM putting her at risk for falls. She would benefit from skilled OT to address these benefits. Attempted to call son to gain more insight into level of assist at home. Due to increased fall risk, recommend SNF at d/c vs home w/ HH- will monitor pt progress to further assess d/c needs.  -EC           Positioning and Restraints    Pre-Treatment Position in bed  -EC        Post Treatment Position chair  -EC        In Chair notified nsg;reclined;call light within reach;encouraged to call for assist;exit alarm on;with nsg;legs elevated  -EC           Therapy Assessment/Plan (OT)    Date of Referral to OT 10/16/23  -EC        OT Diagnosis decreased ADLs  -EC        Rehab Potential (OT) good, to achieve stated therapy goals  -EC        Criteria for Skilled Therapeutic Interventions Met (OT) yes;meets criteria;skilled treatment is necessary  -EC        Therapy Frequency (OT) 5 times/wk  -EC        Predicted Duration of Therapy Intervention (OT) 10 days  -EC        Planned Therapy Interventions (OT) activity tolerance training;adaptive equipment training;BADL retraining;functional balance retraining;occupation/activity based interventions;patient/caregiver education/training;ROM/therapeutic exercise;strengthening exercise;transfer/mobility retraining  -EC           OT Goals    Transfer Goal Selection (OT) transfer, " OT goal 1  -EC        Bathing Goal Selection (OT) bathing, OT goal 1  -EC        Toileting Goal Selection (OT) toileting, OT goal 1  -EC           Transfer Goal 1 (OT)    Activity/Assistive Device (Transfer Goal 1, OT) sit-to-stand/stand-to-sit;bed-to-chair/chair-to-bed;toilet;shower chair;commode;commode, bedside with drop arms  -EC        [place] Level/Cues Needed (Transfer Goal 1, OT) modified independence  -EC        Time Frame (Transfer Goal 1, OT) long term goal (LTG)  -EC        Progress/Outcome (Transfer Goal 1, OT) new goal  -EC           Bathing Goal 1 (OT)    Activity/Device (Bathing Goal 1, OT) upper body bathing;lower body bathing;grab bar, tub/shower;shower chair  -EC        [place] Level/Cues Needed (Bathing Goal 1, OT) minimum assist (75% or more patient effort)  -EC        Time Frame (Bathing Goal 1, OT) long term goal (LTG)  -EC        Progress/Outcomes (Bathing Goal 1, OT) new goal  -EC           Toileting Goal 1 (OT)    Activity/Device (Toileting Goal 1, OT) adjust/manage clothing;perform perineal hygiene;commode;commode, bedside with drop arms;grab bar/safety frame  -EC        [place] Level/Cues Needed (Toileting Goal 1, OT) contact guard required  -EC        Time Frame (Toileting Goal 1, OT) long term goal (LTG)  -EC        Progress/Outcome (Toileting Goal 1, OT) new goal  -EC                  User Key  (r) = Recorded By, (t) = Taken By, (c) = Cosigned By      Initials Name Effective Dates     Danitza Uribe RN 03/30/22 -     EC Windy Bianchi OTR/L 10/13/23 -                      Occupational Therapy Education       Title: PT OT SLP Therapies (Done)       Topic: Occupational Therapy (Done)       Point: ADL training (Done)       Description:   Instruct learner(s) on proper safety adaptation and remediation techniques during self care or transfers.   Instruct in proper use of assistive devices.                  Learning Progress Summary             Patient Acceptance, E,  "VU,NR by  at 10/17/2023 0942    Comment: role of OT, fall prevention, safety mechanics                         Point: Precautions (Done)       Description:   Instruct learner(s) on prescribed precautions during self-care and functional transfers.                  Learning Progress Summary             Patient Acceptance, E, VU,NR by  at 10/17/2023 0942    Comment: role of OT, fall prevention, safety mechanics                         Point: Body mechanics (Done)       Description:   Instruct learner(s) on proper positioning and spine alignment during self-care, functional mobility activities and/or exercises.                  Learning Progress Summary             Patient Acceptance, E, VU,NR by  at 10/17/2023 0942    Comment: role of OT, fall prevention, safety mechanics                                         User Key       Initials Effective Dates Name Provider Type Discipline     10/13/23 -  Windy Bianchi, OTR/L Occupational Therapist OT                      OT Recommendation and Plan  Planned Therapy Interventions (OT): activity tolerance training, adaptive equipment training, BADL retraining, functional balance retraining, occupation/activity based interventions, patient/caregiver education/training, ROM/therapeutic exercise, strengthening exercise, transfer/mobility retraining  Therapy Frequency (OT): 5 times/wk  Plan of Care Review  Plan of Care Reviewed With: patient  Progress: no change  Outcome Evaluation: OT eval complete. Pt alert & oriented to self, knows she is in the hospital. Pt reports being able to walk short distances at home but needing help from  for dressing- unsure of how much help. Pt reports she frequently falls and just \"loses her balance\" Pt modA for supine>sit, demos good sitting balance EOB. BUE ROM WFL, w/ exception of B shoulders, R shoulder significantly impaired due to arthritis. B biceps, triceps, &  3+/5. Simulated don/doff socks, anticipate needing mod-maxA. Pt " "incontinent of urine, modA to change brief and perform perineal hygiene. Pt sit>stand modA & amb to chair w/ rwx. Pt performed sit<>stand x3 and progressed from modA to [place] on 3rd attempt. Pt left in chair eating breakfast w/ chair alarm. Pt demos deficits in balance,strength, & ROM putting her at risk for falls. She would benefit from skilled OT to address these benefits. Attempted to call son to gain more insight into level of assist at home. Due to increased fall risk, recommend SNF at d/c vs home w/ HH- will monitor pt progress to further assess d/c needs.  Plan of Care Reviewed With: patient  Outcome Evaluation: OT eval complete. Pt alert & oriented to self, knows she is in the hospital. Pt reports being able to walk short distances at home but needing help from  for dressing- unsure of how much help. Pt reports she frequently falls and just \"loses her balance\" Pt modA for supine>sit, demos good sitting balance EOB. BUE ROM WFL, w/ exception of B shoulders, R shoulder significantly impaired due to arthritis. B biceps, triceps, &  3+/5. Simulated don/doff socks, anticipate needing mod-maxA. Pt incontinent of urine, modA to change brief and perform perineal hygiene. Pt sit>stand modA & amb to chair w/ rwx. Pt performed sit<>stand x3 and progressed from modA to [place] on 3rd attempt. Pt left in chair eating breakfast w/ chair alarm. Pt demos deficits in balance,strength, & ROM putting her at risk for falls. She would benefit from skilled OT to address these benefits. Attempted to call son to gain more insight into level of assist at home. Due to increased fall risk, recommend SNF at d/c vs home w/ HH- will monitor pt progress to further assess d/c needs.     Outcome Measures       Row Name 10/17/23 0900             How much help from another is currently needed...    Putting on and taking off regular lower body clothing? 2  -EC      Bathing (including washing, rinsing, and drying) 2  -EC      Toileting " (which includes using toilet bed pan or urinal) 2  -EC      Putting on and taking off regular upper body clothing 3  -EC      Taking care of personal grooming (such as brushing teeth) 3  -EC      Eating meals 4  -EC      AM-PAC 6 Clicks Score (OT) 16  -EC         Functional Assessment    Outcome Measure Options AM-PAC 6 Clicks Daily Activity (OT)  -EC                User Key  (r) = Recorded By, (t) = Taken By, (c) = Cosigned By      Initials Name Provider Type    EC Windy Bianchi, OTR/L Occupational Therapist                    Time Calculation:    Time Calculation- OT       Row Name 10/17/23 0920             Time Calculation- OT    OT Start Time 0825  + 15 min chart review & check on pt in earlier AM  -EC      OT Stop Time 0910  -EC      OT Time Calculation (min) 45 min  -EC      OT Received On 10/17/23  -EC      OT Goal Re-Cert Due Date 10/27/23  -EC         Untimed Charges    OT Eval/Re-eval Minutes 60  -EC         Total Minutes    Untimed Charges Total Minutes 60  -EC       Total Minutes 60  -EC                User Key  (r) = Recorded By, (t) = Taken By, (c) = Cosigned By      Initials Name Provider Type    EC Windy Bianchi, OTR/L Occupational Therapist                  Therapy Charges for Today       Code Description Service Date Service Provider Modifiers Qty    [id number] HC OT EVAL MOD COMPLEXITY 3 10/17/2023 Windy Bianchi OTR/L GO 1                 Windy Bianchi OTR/L  10/17/2023

## 2023-10-17 NOTE — CONSULTS
[facility]  INPATIENT WOUND & OSTOMY CONSULTATION    Today's Date: 10/17/23    Patient Name: Kim Davison  MRN: [id number]  CSN: [id number]  PCP: Winifred Kramer APRN  Referring Provider:   Consulting Provider (From admission, onward)      Start Ordered     Status Ordering Provider    10/17/23 1219 10/17/23 1219    Once        Specialty:  Wound Care  Provider:  (Not yet assigned)    Canceled ANGELA MCNEILL    10/16/23 1618 10/16/23 1618  Inpatient Wound Care MD Consult  Once        Specialty:  Wound Care  Provider:  Melody Damon APRN    Acknowledged ANGELA MCNEILL           Attending Provider: Angela Mcneill  Length of Stay: 1    SUBJECTIVE   Chief Complaint: ***    HPI: Kim Davison, a 87 y.o.female, presents with a past medical history of ***.  A full past medical history as listed below.  ***    Inpatient wound care consulted due to ***      Visit Dx:    ICD-10-CM ICD-9-CM   1. Decreased activities of daily living (ADL)  Z78.9 V49.89   2. Confusion  R41.0 298.9   3. Acute UTI (urinary tract infection)  N39.0 599.0   4. Wound of left lower extremity, sequela  S81.802S 906.1       Hospital Problem List:     Decreased activities of daily living (ADL)    Hypertension    Urinary tract infection without hematuria      History:   Past Medical History:   Diagnosis Date    Abnormal EKG 2/1/2019    Atrioventricular block, Mobitz type 1, Wenckebach     Heart murmur     Hyperglycemia     Hypertension     Insomnia     Neuropathy     Orthostasis     Skin cancer     unknown what type    SVT (supraventricular tachycardia)     Syncope      Past Surgical History:   Procedure Laterality Date    APPENDECTOMY  03/20/2007    BREAST BIOPSY      CATARACT EXTRACTION      with lens implants    HEMORRHOIDECTOMY  03/20/2007    LUMBAR SPINE SURGERY  2002    SKIN CANCER EXCISION       Social History     Socioeconomic History    Marital status:    Tobacco Use    Smoking status: Every Day      Packs/day: 0.25     Years: 60.00     Additional pack years: 0.00     Total pack years: 15.00     Types: Cigarettes     Start date: 1956    Smokeless tobacco: Never    Tobacco comments:     smoking 4-5 cigs per day (had been 1-3 per day at last visit), smoked less than 1/2 ppd for years - only able to completely quit for 2-3 months at a time, quit several times   Vaping Use    Vaping Use: Never used   Substance and Sexual Activity    Alcohol use: No    Drug use: No    Sexual activity: Defer     Family History   Problem Relation Age of Onset    Stomach cancer Mother     Heart attack Father     Lymphoma Sister     Heart disease Brother     Lung cancer Son        Allergies:  No Known Allergies    Medications:    Current Facility-Administered Medications:     acetaminophen (TYLENOL) tablet 650 mg, 650 mg, Oral, Q4H PRN, Angela Mcneill    sennosides-docusate (PERICOLACE) 8.6-50 MG per tablet 2 tablet, 2 tablet, Oral, Nightly PRN **AND** polyethylene glycol (MIRALAX) packet 17 g, 17 g, Oral, Daily PRN **AND** bisacodyl (DULCOLAX) EC tablet 5 mg, 5 mg, Oral, Daily PRN **AND** bisacodyl (DULCOLAX) suppository 10 mg, 10 mg, Rectal, Daily PRN, Angela Mcneill    busPIRone (BUSPAR) tablet 10 mg, 10 mg, Oral, Q8H, Angela Mcneill, 10 mg at 10/17/23 1324    cefTRIAXone (ROCEPHIN) 1,000 mg in sodium chloride 0.9 % 100 mL IVPB, 1,000 mg, Intravenous, Q24H, Angela Mcneill, Last Rate: 200 mL/hr at 10/17/23 1323, 1,000 mg at 10/17/23 1323    citalopram (CeleXA) tablet 10 mg, 10 mg, Oral, Daily, Angela Mcneill, 10 mg at 10/17/23 0925    hydroCHLOROthiazide (HYDRODIURIL) tablet 12.5 mg, 12.5 mg, Oral, Daily, Angela Mcneill, 12.5 mg at 10/17/23 0924    hydrOXYzine (ATARAX) tablet 10 mg, 10 mg, Oral, TID PRN, Angela Mcneill    lisinopril (PRINIVIL,ZESTRIL) tablet 20 mg, 20 mg, Oral, Q24H, Angela Mcneill, 20 mg at 10/17/23 0923    megestrol (MEGACE) 40 MG/ML suspension 200 mg, 200 mg, Oral, Daily,  Angela Mcneill, 200 mg at 10/17/23 0926    metoprolol tartrate (LOPRESSOR) tablet 25 mg, 25 mg, Oral, Q12H, Angela Mcneill, 25 mg at 10/17/23 0922    ondansetron (ZOFRAN) tablet 4 mg, 4 mg, Oral, Q6H PRN, Angela Mcneill    pregabalin (LYRICA) capsule 75 mg, 75 mg, Oral, BID, Angela Mcneill, 75 mg at 10/17/23 0921    [COMPLETED] Insert Peripheral IV, , , Once **AND** sodium chloride 0.9 % flush 10 mL, 10 mL, Intravenous, PRN, Oswaldo Guerin MD    sodium chloride 0.9 % flush 10 mL, 10 mL, Intravenous, Q12H, Angela Mcneill, 10 mL at 10/17/23 0928    sodium chloride 0.9 % flush 10 mL, 10 mL, Intravenous, PRN, Angela Mcneill    sodium chloride 0.9 % infusion 40 mL, 40 mL, Intravenous, PRN, Angela Mcneill    sodium chloride 0.9 % with KCl 20 mEq/L infusion, 50 mL/hr, Intravenous, Continuous, Angela Mcneill, Last Rate: 50 mL/hr at 10/17/23 1324, 50 mL/hr at 10/17/23 1324    vitamin B-12 (CYANOCOBALAMIN) tablet 1,000 mcg, 1,000 mcg, Oral, Daily, Angela Mcneill, 1,000 mcg at 10/17/23 0922    zolpidem (AMBIEN) tablet 10 mg, 10 mg, Oral, Nightly PRN, Abraham Manuel MD, 10 mg at 10/17/23 0206    Review of Systems: ***  Review of Systems      OBJECTIVE     Vitals:    10/17/23 0906   BP: 164/82   Pulse: 76   Resp: 17   Temp: 98.5 °F (36.9 °C)   SpO2: 96%       PHYSICAL EXAM: ***  Physical Exam       Results Review:  Lab Results (last 48 hours)       Procedure Component Value Units Date/Time    Blood Culture - Blood, Arm, Left [[id number]]  (Normal) Collected: 10/16/23 1240    Specimen: Blood from Arm, Left Updated: 10/17/23 1301     Blood Culture No growth at 24 hours    Blood Culture - Blood, Arm, Right [[id number]]  (Normal) Collected: 10/16/23 1203    Specimen: Blood from Arm, Right Updated: 10/17/23 1215     Blood Culture No growth at 24 hours    Urine Culture - Urine, Straight Cath [[id number]]  (Abnormal) Collected: 10/16/23 1223    Specimen: Urine from Straight Cath  Updated: 10/17/23 0845     Urine Culture >100,000 CFU/mL Gram Negative Bacilli    Narrative:      Colonization of the urinary tract without infection is common. Treatment is discouraged unless the patient is symptomatic, pregnant, or undergoing an invasive urologic procedure.    Comprehensive Metabolic Panel [[id number]]  (Abnormal) Collected: 10/17/23 0552    Specimen: Blood Updated: 10/17/23 0626     Glucose 106 mg/dL      BUN 13 mg/dL      Creatinine 0.59 mg/dL      Sodium 137 mmol/L      Potassium 3.6 mmol/L      Chloride 104 mmol/L      CO2 22.0 mmol/L      Calcium 8.9 mg/dL      Total Protein 6.1 g/dL      Albumin 3.3 g/dL      ALT (SGPT) 7 U/L      AST (SGOT) 10 U/L      Alkaline Phosphatase 108 U/L      Total Bilirubin 0.6 mg/dL      Globulin 2.8 gm/dL      A/G Ratio 1.2 g/dL      BUN/Creatinine Ratio 22.0     Anion Gap 11.0 mmol/L      eGFR 87.9 mL/min/1.73     Narrative:      GFR Normal >60  Chronic Kidney Disease <60  Kidney Failure <15    The GFR formula is only valid for adults with stable renal function between ages 18 and 70.    CBC Auto Differential [[id number]]  (Abnormal) Collected: 10/17/23 0552    Specimen: Blood Updated: 10/17/23 0604     WBC 5.68 10*3/mm3      RBC 4.29 10*6/mm3      Hemoglobin 12.5 g/dL      Hematocrit 37.4 %      MCV 87.2 fL      MCH 29.1 pg      MCHC 33.4 g/dL      RDW 13.0 %      RDW-SD 41.6 fl      MPV 8.5 fL      Platelets 386 10*3/mm3      Neutrophil % 55.9 %      Lymphocyte % 29.0 %      Monocyte % 9.3 %      Eosinophil % 4.0 %      Basophil % 0.9 %      Immature Grans % 0.9 %      Neutrophils, Absolute 3.17 10*3/mm3      Lymphocytes, Absolute 1.65 10*3/mm3      Monocytes, Absolute 0.53 10*3/mm3      Eosinophils, Absolute 0.23 10*3/mm3      Basophils, Absolute 0.05 10*3/mm3      Immature Grans, Absolute 0.05 10*3/mm3      nRBC 0.0 /100 WBC     Urinalysis With Culture If Indicated - Straight Cath [[id number]]  (Abnormal) Collected: 10/16/23 1223    Specimen: Urine from  "Straight Cath Updated: 10/16/23 1241     Color, UA Dark Yellow     Appearance, UA Turbid     pH, UA 8.5     Specific Gravity, UA 1.019     Glucose, UA Negative     Ketones, UA Trace     Bilirubin, UA Negative     Blood, UA Trace     Protein, UA >=300 mg/dL (3+)     Leuk Esterase, UA Large (3+)     Nitrite, UA Positive     Urobilinogen, UA 1.0 E.U./dL    Narrative:      In absence of clinical symptoms, the presence of pyuria, bacteria, and/or nitrites on the urinalysis result does not correlate with infection.    Urinalysis, Microscopic Only - Straight Cath [[id number]]  (Abnormal) Collected: 10/16/23 1223    Specimen: Urine from Straight Cath Updated: 10/16/23 1241     RBC, UA 6-10 /HPF      WBC, UA Too Numerous to Count /HPF      Bacteria, UA 4+ /HPF      Squamous Epithelial Cells, UA 3-6 /HPF      Methodology Manual Light Microscopy    Procalcitonin [[id number]]  (Normal) Collected: 10/16/23 1203    Specimen: Blood Updated: 10/16/23 1237     Procalcitonin 0.06 ng/mL     Narrative:      As a Marker for Sepsis (Non-Neonates):    1. <0.5 ng/mL represents a low risk of severe sepsis and/or septic shock.  2. >2 ng/mL represents a high risk of severe sepsis and/or septic shock.    As a Marker for Lower Respiratory Tract Infections that require antibiotic therapy:    PCT on Admission    Antibiotic Therapy       6-12 Hrs later    >0.5                Strongly Recommended  >0.25 - <0.5        Recommended   0.1 - 0.25          Discouraged              Remeasure/reassess PCT  <0.1                Strongly Discouraged     Remeasure/reassess PCT    As 28 day mortality risk marker: \"Change in Procalcitonin Result\" (>80% or <=80%) if Day 0 (or Day 1) and Day 4 values are available. Refer to http://[email or web address]-pct-[email or web address]    Change in PCT <=80%  A decrease of PCT levels below or equal to 80% defines a positive change in PCT test result representing a higher risk for 28-day all-cause mortality of patients diagnosed with severe " sepsis for septic shock.    Change in PCT >80%  A decrease of PCT levels of more than 80% defines a negative change in PCT result representing a lower risk for 28-day all-cause mortality of patients diagnosed with severe sepsis or septic shock.       Comprehensive Metabolic Panel [[id number]]  (Abnormal) Collected: 10/16/23 1203    Specimen: Blood Updated: 10/16/23 1231     Glucose 103 mg/dL      BUN 21 mg/dL      Creatinine 0.89 mg/dL      Sodium 140 mmol/L      Potassium 4.1 mmol/L      Comment: Slight hemolysis detected by analyzer. Results may be affected.        Chloride 105 mmol/L      CO2 25.0 mmol/L      Calcium 9.4 mg/dL      Total Protein 6.5 g/dL      Albumin 3.7 g/dL      ALT (SGPT) 8 U/L      AST (SGOT) 11 U/L      Alkaline Phosphatase 114 U/L      Total Bilirubin 0.5 mg/dL      Globulin 2.8 gm/dL      A/G Ratio 1.3 g/dL      BUN/Creatinine Ratio 23.6     Anion Gap 10.0 mmol/L      eGFR 63.2 mL/min/1.73     Narrative:      GFR Normal >60  Chronic Kidney Disease <60  Kidney Failure <15    The GFR formula is only valid for adults with stable renal function between ages 18 and 70.    Magnesium [[id number]]  (Normal) Collected: 10/16/23 1203    Specimen: Blood Updated: 10/16/23 1231     Magnesium 2.1 mg/dL     Lactic Acid, Plasma [[id number]]  (Normal) Collected: 10/16/23 1203    Specimen: Blood Updated: 10/16/23 1229     Lactate 1.0 mmol/L     CBC & Differential [[id number]]  (Abnormal) Collected: 10/16/23 1203    Specimen: Blood Updated: 10/16/23 1212    Narrative:      The following orders were created for panel order CBC & Differential.  Procedure                               Abnormality         Status                     ---------                               -----------         ------                     CBC Auto Differential[[id number]]        Abnormal            Final result                 Please view results for these tests on the individual orders.    CBC Auto Differential [[id number]]  (Abnormal)  Collected: 10/16/23 1203    Specimen: Blood Updated: 10/16/23 1212     WBC 5.20 10*3/mm3      RBC 4.46 10*6/mm3      Hemoglobin 13.0 g/dL      Hematocrit 39.9 %      MCV 89.5 fL      MCH 29.1 pg      MCHC 32.6 g/dL      RDW 13.2 %      RDW-SD 42.7 fl      MPV 8.6 fL      Platelets 397 10*3/mm3      Neutrophil % 55.1 %      Lymphocyte % 30.0 %      Monocyte % 9.4 %      Eosinophil % 3.5 %      Basophil % 1.2 %      Immature Grans % 0.8 %      Neutrophils, Absolute 2.87 10*3/mm3      Lymphocytes, Absolute 1.56 10*3/mm3      Monocytes, Absolute 0.49 10*3/mm3      Eosinophils, Absolute 0.18 10*3/mm3      Basophils, Absolute 0.06 10*3/mm3      Immature Grans, Absolute 0.04 10*3/mm3      nRBC 0.0 /100 WBC           Imaging Results (Last 72 Hours)       Procedure Component Value Units Date/Time    CT Head Without Contrast [[id number]] Collected: 10/17/23 0808     Updated: 10/17/23 0815    Narrative:      EXAMINATION: CT HEAD WO CONTRAST-      10/17/2023 7:58 AM CDT     HISTORY: Mental status change, unknown cause; Z78.9-Other specified  health status; R41.0-Disorientation, unspecified; N39.0-Urinary tract  infection, site not specified; S81.802S-Unspecified open wound, left  lower leg, sequela     In order to have a CT radiation dose as low as reasonably achievable  Automated Exposure Control was utilized for adjustment of the mA and/or  KV according to patient size.     DLP in mGycm= 614     The CT scan of the head is performed without intravenous contrast  enhancement.     Images are acquired in axial plane and subsequent reconstruction in  coronal and sagittal planes.     There is no previous similar study for comparison.     There is no evidence of a mass. There is no midline shift.     There is no evidence of an intracranial hemorrhage or hematoma.     Significantly dilated ventricles are seen out of proportion to  moderately dilated basal cisterns and the cortical sulci which may  suggest an element of hydrocephalus  in addition to chronic atrophy.     Scattered areas of chronic ischemia in the white matter bilaterally. The  gray-white matter differentiation is maintained.     There is heavy calcification of the anterior falx.     The images reviewed in bone window show no evidence of a skull fracture.  Limited visualized paranasal sinuses and mastoid air cells are  unremarkable.       Impression:      1. No acute intracranial abnormality.  2. Chronic atrophy and communicating hydrocephalus.  3. 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                                This report was signed and finalized on 10/17/2023 8:12 AM CDT by Dr. Pratima Willis MD.       XR Chest 1 View [[id number]] Collected: 10/16/23 1248     Updated: 10/16/23 1254    Narrative:      EXAMINATION: XR CHEST 1 VW-     10/16/2023 12:38 PM CDT     HISTORY: Weakness     COMPARISON:  3/21/2021.     1 view chest x-ray.     Heart size is normal.  Aortic arch calcification.  The mediastinum is within normal limits.     The lungs are normally expanded with no pneumonia or pneumothorax.     No congestive failure changes.     Osteopenic bones.  High-grade degenerative change at each shoulder.       Impression:      1. No acute lung disease.           This report was signed and finalized on 10/16/2023 12:51 PM CDT by Dr. Leonardo Cutler MD.                  ASSESSMENT/PLAN       Examination and evaluation of wound(s) was performed.    DIAGNOSIS:   ***    PLAN:   ***      Start     Ordered    10/17/23 1501  Wound Care Skin Tear  Daily      Question Answer Comment   Wound Locations Left distal arm    Wound Care Instructions Clean with NS. Apply Xeroform gauze and wrap with Kerlix.        10/17/23 1500    10/17/23 1500  Turn Patient  Now Then Every 2 Hours         10/17/23 1500    10/17/23 1500  Elevate Heels Off of Bed  Until Discontinued         10/17/23 1500    10/17/23 1500  Use Seat Cushion When Up In Chair  Continuous          10/17/23 1500    10/17/23 1500  Silicone Border Dressing to Bony Prominences  Every Shift       10/17/23 1500    10/17/23 1500  Do NOT Rub or Massage Any Bony Prominence  Continuous         10/17/23 1500    10/17/23 1500  Use Repositioning Wedge to Position Patient  Continuous        Comments: Use Comfort Glide repositioning sheet and wedges to position patient.    10/17/23 1500    10/17/23 1459  Wound Care  Daily      Question Answer Comment   Wound Locations Right knee and left lower leg    Wound Care Instructions Clean with NS.  Apply therahoney and vaseline gauze. Wrap with Kerlix.        10/17/23 6478                     Discussed findings and treatment plan including risks, benefits, and treatment options with *** in detail. Patient agreed with treatment plan.      This document has been electronically signed by RAVEN Jhaveri on 10/17/2023 14:57 CDT

## 2023-10-17 NOTE — PLAN OF CARE
Goal Outcome Evaluation:  Plan of Care Reviewed With: patient, spouse        Progress: no change  Outcome Evaluation: PT eval completed.  Pt pleasant and agreeable to therapy, however oriented to person and knew she was in the hospital, but did not know which one.  Pt w/ flat affect and slow to respond.  Pt demonstrates decrease ROM R shld and decrease strength R shld and L hip.  Pt w/ recent hx of frequent falls.  Pt performed sit to/from standing from chair and toilet w/ min to mod assist w/ VCs and education for improved technique.  Initially upon standing pt w/ heavy posterior lean requiring assist to recover.  Pt then able to ambulate ~ 90 ft w/ noted flexed posture, forward head, decrease gait speed, step length and foot clearance w/ decrease heel strike.  Education for improved placement of rwx and improved gait mechanics.  At times pt required assist to manage rwx.  Will follow for progress and needs.  Recommend continued skilled care at discharge.  At this time, feel pt would benefit from inpatient stay in subacute care, however discharge home would depend on pt progress and availability of assistance for home.      Anticipated Discharge Disposition (PT): sub acute care setting

## 2023-10-17 NOTE — THERAPY EVALUATION
Patient Name: Kim Davison  : 1935    MRN: [id number]                              Today's Date: 10/17/2023       Admit Date: 10/16/2023    Visit Dx:     ICD-10-CM ICD-9-CM   1. Decreased activities of daily living (ADL)  Z78.9 V49.89   2. Confusion  R41.0 298.9   3. Acute UTI (urinary tract infection)  N39.0 599.0   4. Wound of left lower extremity, sequela  S81.802S 906.1   5. Impaired functional mobility and activity tolerance [Z74.09]  Z74.09 V49.89     Patient Active Problem List   Diagnosis    Palpitations    Abnormal EKG    Hypertension    Shortness of breath    Atrioventricular block, Mobitz type 1, Wenckebach    SVT (supraventricular tachycardia)    Orthostasis    Weight loss, non-intentional    Tobacco use    Decreased activities of daily living (ADL)    Urinary tract infection without hematuria     Past Medical History:   Diagnosis Date    Abnormal EKG 2019    Atrioventricular block, Mobitz type 1, Wenckebach     Heart murmur     Hyperglycemia     Hypertension     Insomnia     Neuropathy     Orthostasis     Skin cancer     unknown what type    SVT (supraventricular tachycardia)     Syncope      Past Surgical History:   Procedure Laterality Date    APPENDECTOMY  2007    BREAST BIOPSY      CATARACT EXTRACTION      with lens implants    HEMORRHOIDECTOMY  2007    LUMBAR SPINE SURGERY  2002    SKIN CANCER EXCISION        General Information       Row Name 10/17/23 1348          Physical Therapy Time and Intention    Document Type evaluation;other (see comments)  see  MAR  -JE     Mode of Treatment physical therapy  -       Row Name 10/17/23 1348          General Information    Patient Profile Reviewed yes  -JE     Prior Level of Function independent:;feeding;grooming;min assist:;transfer;mod assist:;dressing;bathing;dependent:;home management;driving;shopping  -JE     Existing Precautions/Restrictions fall  -JE     Barriers to Rehab medically complex;cognitive status;previous  functional deficit;impaired sensation  -       Row Name 10/17/23 1348          Living Environment    People in Home spouse  -     Name(s) of People in Home lives w/ spouse  -       Row Name 10/17/23 1348          Home Main Entrance    Number of Stairs, Main Entrance other (see comments)  ramp to enter home  -       Row Name 10/17/23 1348          Stairs Within Home, Primary    Number of Stairs, Within Home, Primary none  -       Row Name 10/17/23 1348          Cognition    Orientation Status (Cognition) oriented to;person;disoriented to;place;situation;time;other (see comments)  however did know she was in the hospital  -       Row Name 10/17/23 1348          Safety Issues, Functional Mobility    Safety Issues Affecting Function (Mobility) at risk behavior observed;awareness of need for assistance;friction/shear risk;insight into deficits/self-awareness;positioning of assistive device;safety precaution awareness  -     Impairments Affecting Function (Mobility) balance;cognition;endurance/activity tolerance;strength;sensation/sensory awareness  -     Cognitive Impairments, Mobility Safety/Performance awareness, need for assistance;insight into deficits/self-awareness;safety precaution awareness  -               User Key  (r) = Recorded By, (t) = Taken By, (c) = Cosigned By      Initials Name Provider Type    Alecia Bernstein, PT Physical Therapist                   Mobility       Row Name 10/17/23 1348          Bed Mobility    Comment, (Bed Mobility) sitting up in a chair and returned to sitting in chair at end of visit  -       Row Name 10/17/23 1348          Sit-Stand Transfer    Sit-Stand [place] (Transfers) minimum assist (75% patient effort);moderate assist (50% patient effort);verbal cues  -     Comment, (Sit-Stand Transfer) education for safe technique  -       Row Name 10/17/23 1348          Gait/Stairs (Locomotion)    [place] Level (Gait) contact guard;other (see  comments)  occassional assist to manage rwx  -     Assistive Device (Gait) walker, front-wheeled  -     Distance in Feet (Gait) ~ 90 ft  -     Deviations/Abnormal Patterns (Gait) gait speed decreased;stride length decreased  -     Bilateral Gait Deviations forward flexed posture;heel strike decreased  -               User Key  (r) = Recorded By, (t) = Taken By, (c) = Cosigned By      Initials Name Provider Type    Alecia Bernstein, PT Physical Therapist                   Obj/Interventions       Row Name 10/17/23 3444          Range of Motion Comprehensive    Comment, General Range of Motion AROM all 4 extremities WFLs except R shld less than 50%, L hip flexion decreased compared to R, however AAROM L hip WFLs  -       Row Name 10/17/23 1518          Strength Comprehensive (MMT)    Comment, General Manual Muscle Testing (MMT) Assessment grossly 4/5 except R shld not formally assessed, L hip flexor 2/5  -       Row Name 10/17/23 1346          Balance    Balance Assessment sitting static balance;sitting dynamic balance;sit to stand dynamic balance;standing static balance;standing dynamic balance  -     Static Sitting Balance independent  -     Dynamic Sitting Balance independent  -     Position, Sitting Balance supported;sitting in chair  -     Sit to Stand Dynamic Balance minimal assist;moderate assist;verbal cues  -     Static Standing Balance contact guard;moderate assist;other (see comments)  initially w/ posterior lean in standing  -     Dynamic Standing Balance contact guard  -     Position/Device Used, Standing Balance supported;walker, rolling  -       Row Name 10/17/23 1347          Sensory Assessment (Somatosensory)    Sensory Assessment (Somatosensory) other (see comments)  rports numbness in lower legs/feet that comes and goes  -               User Key  (r) = Recorded By, (t) = Taken By, (c) = Cosigned By      Initials Name Provider Type    Alecia Bernstein, PT Physical  Therapist                   Goals/Plan       Row Name 10/17/23 3531          Bed Mobility Goal 1 (PT)    Activity/Assistive Device (Bed Mobility Goal 1, PT) rolling to left;rolling to right;scooting;sit to supine/supine to sit;sidelying to sit/sit to sidelying  -JE     [place] Level/Cues Needed (Bed Mobility Goal 1, PT) contact guard required  -JE     Time Frame (Bed Mobility Goal 1, PT) long term goal (LTG);10 days  -JE     Progress/Outcomes (Bed Mobility Goal 1, PT) goal ongoing  -       Row Name 10/17/23 5729          Transfer Goal 1 (PT)    Activity/Assistive Device (Transfer Goal 1, PT) sit-to-stand/stand-to-sit;bed-to-chair/chair-to-bed;other (see comments)  rwx for bed to/from chair  -JE     [place] Level/Cues Needed (Transfer Goal 1, PT) contact guard required  -JE     Time Frame (Transfer Goal 1, PT) long term goal (LTG);10 days  -JE     Progress/Outcome (Transfer Goal 1, PT) goal ongoing  -       Row Name 10/17/23 4364          Gait Training Goal 1 (PT)    Activity/Assistive Device (Gait Training Goal 1, PT) gait (walking locomotion);assistive device use;decrease fall risk;diminish gait deviation;improve balance and speed;increase endurance/gait distance;increase energy conservation;walker, rolling  -JE     [place] Level (Gait Training Goal 1, PT) standby assist  -JE     Distance (Gait Training Goal 1, PT) 100 ft managing rwx I w/ consistent and equal step length  -JE     Time Frame (Gait Training Goal 1, PT) long term goal (LTG);10 days  -JE     Progress/Outcome (Gait Training Goal 1, PT) goal ongoing  -       Row Name 10/17/23 8655          Therapy Assessment/Plan (PT)    Planned Therapy Interventions (PT) balance training;bed mobility training;gait training;postural re-education;patient/family education;strengthening;transfer training;other (see comments)  safety/falls prevention, skin protection/pressure relief  -               User Key  (r) = Recorded By, (t) = Taken By, (c) =  Cosigned By      Initials Name Provider Type    Alecia Bernstein, PT Physical Therapist                   Clinical Impression       Row Name 10/17/23 0178          Pain    Pretreatment Pain Rating 0/10 - no pain  -     Posttreatment Pain Rating 0/10 - no pain  -       Row Name 10/17/23 7611          Plan of Care Review    Plan of Care Reviewed With patient;spouse  -     Progress no change  -     Outcome Evaluation PT eval completed.  Pt pleasant and agreeable to therapy, however oriented to person and knew she was in the hospital, but did not know which one.  Pt w/ flat affect and slow to respond.  Pt demonstrates decrease ROM R shld and decrease strength R shld and L hip.  Pt w/ recent hx of frequent falls.  Pt performed sit to/from standing from chair and toilet w/ min to mod assist w/ VCs and education for improved technique.  Initially upon standing pt w/ heavy posterior lean requiring assist to recover.  Pt then able to ambulate ~ 90 ft w/ noted flexed posture, forward head, decrease gait speed, step length and foot clearance w/ decrease heel strike.  Education for improved placement of rwx and improved gait mechanics.  At times pt required assist to manage rwx.  Will follow for progress and needs.  Recommend continued skilled care at discharge.  At this time, feel pt would benefit from inpatient stay in subacute care, however discharge home would depend on pt progress and availability of assistance for home.  -       Row Name 10/17/23 3620          Therapy Assessment/Plan (PT)    Patient/Family Therapy Goals Statement (PT) improve mobility  -     Rehab Potential (PT) good, to achieve stated therapy goals  -     Criteria for Skilled Interventions Met (PT) yes;meets criteria;skilled treatment is necessary  -     Therapy Frequency (PT) 2 times/day  -     Predicted Duration of Therapy Intervention (PT) until discharge or goals achieved  -       Row Name 10/17/23 6632          Vital Signs     O2 Delivery Pre Treatment room air  -JE     O2 Delivery Intra Treatment room air  -JE     O2 Delivery Post Treatment room air  -JE     Pre Patient Position Sitting  -JE     Intra Patient Position Standing  -JE     Post Patient Position Sitting  -JE       Row Name 10/17/23 1348          Positioning and Restraints    Pre-Treatment Position sitting in chair/recliner  -JE     Post Treatment Position chair  -JE     In Chair reclined;call light within reach;encouraged to call for assist;exit alarm on;with family/caregiver;legs elevated  -JE               User Key  (r) = Recorded By, (t) = Taken By, (c) = Cosigned By      Initials Name Provider Type    Alecia Bernstein, PT Physical Therapist                   Outcome Measures       Row Name 10/17/23 1348 10/17/23 0749       How much help from another person do you currently need...    Turning from your back to your side while in flat bed without using bedrails? 3  -JE 3  -CM    Moving from lying on back to sitting on the side of a flat bed without bedrails? 3  -JE 3  -CM    Moving to and from a bed to a chair (including a wheelchair)? 3  -JE 2  -CM    Standing up from a chair using your arms (e.g., wheelchair, bedside chair)? 3  -JE 2  -CM    Climbing 3-5 steps with a railing? 2  -JE 2  -CM    To walk in hospital room? 3  -JE 2  -CM    AM-PAC 6 Clicks Score (PT) 17  -JE 14  -CM    Highest level of mobility 5 --> Static standing  -JE 4 --> Transferred to chair/commode  -CM      Row Name 10/17/23 1348 10/17/23 0900       Functional Assessment    Outcome Measure Options AM-PAC 6 Clicks Basic Mobility (PT)  -JE AM-PAC 6 Clicks Daily Activity (OT)  -EC              User Key  (r) = Recorded By, (t) = Taken By, (c) = Cosigned By      Initials Name Provider Type    Alecia Bernstein, PT Physical Therapist    Jeanette Watts, RN Registered Nurse    Windy Webster, OTR/L Occupational Therapist                                 Physical Therapy Education       Title: PT  OT SLP Therapies (In Progress)       Topic: Physical Therapy (In Progress)       Point: Mobility training (Done)       Learning Progress Summary             Patient Acceptance, E,TB,D, VU,NR by KD at 10/17/2023 1456    Comment: education re: purpose of PT/importance of activity, for safety/falls prevention, for improved technique w/ tfers and gait w/ rwx w/ emphasis on hand placement during transition and increase step length   Significant Other Acceptance, E,TB,D, VU,NR by KD at 10/17/2023 1456    Comment: education re: purpose of PT/importance of activity, for safety/falls prevention, for improved technique w/ tfers and gait w/ rwx w/ emphasis on hand placement during transition and increase step length                         Point: Home exercise program (Not Started)       Learner Progress:  Not documented in this visit.              Point: Precautions (Done)       Learning Progress Summary             Patient Acceptance, E,TB,D, VU,NR by KD at 10/17/2023 1456    Comment: education re: purpose of PT/importance of activity, for safety/falls prevention, for improved technique w/ tfers and gait w/ rwx w/ emphasis on hand placement during transition and increase step length   Significant Other Acceptance, E,TB,D, VU,NR by KD at 10/17/2023 1456    Comment: education re: purpose of PT/importance of activity, for safety/falls prevention, for improved technique w/ tfers and gait w/ rwx w/ emphasis on hand placement during transition and increase step length                                         User Key       Initials Effective Dates Name Provider Type Discipline     08/02/18 -  Alecia Lawrence, PT Physical Therapist PT                  PT Recommendation and Plan  Planned Therapy Interventions (PT): balance training, bed mobility training, gait training, postural re-education, patient/family education, strengthening, transfer training, other (see comments) (safety/falls prevention, skin protection/pressure  relief)  Plan of Care Reviewed With: patient, spouse  Progress: no change  Outcome Evaluation: PT eval completed.  Pt pleasant and agreeable to therapy, however oriented to person and knew she was in the hospital, but did not know which one.  Pt w/ flat affect and slow to respond.  Pt demonstrates decrease ROM R shld and decrease strength R shld and L hip.  Pt w/ recent hx of frequent falls.  Pt performed sit to/from standing from chair and toilet w/ min to mod assist w/ VCs and education for improved technique.  Initially upon standing pt w/ heavy posterior lean requiring assist to recover.  Pt then able to ambulate ~ 90 ft w/ noted flexed posture, forward head, decrease gait speed, step length and foot clearance w/ decrease heel strike.  Education for improved placement of rwx and improved gait mechanics.  At times pt required assist to manage rwx.  Will follow for progress and needs.  Recommend continued skilled care at discharge.  At this time, feel pt would benefit from inpatient stay in subacute care, however discharge home would depend on pt progress and availability of assistance for home.     Time Calculation:         PT Charges       Row Name 10/17/23 1503             Time Calculation    Start Time 1348  -      Stop Time 1459  -      Time Calculation (min) 71 min  -      PT Received On 10/17/23  -      PT Goal Re-Cert Due Date 10/27/23  -                User Key  (r) = Recorded By, (t) = Taken By, (c) = Cosigned By      Initials Name Provider Type    Alecia Bernstein, PT Physical Therapist                  Therapy Charges for Today       Code Description Service Date Service Provider Modifiers Qty    86547833867 HC PT EVAL MOD COMPLEXITY 4 10/17/2023 Alecia Lawrence, PT GP 1    [id number] HC GAIT TRAINING EA 15 MIN 10/17/2023 Alecia Lawrence, PT GP 1            PT G-Codes  Outcome Measure Options: AM-PAC 6 Clicks Basic Mobility (PT)  AM-PAC 6 Clicks Score (PT): 17  AM-PAC 6 Clicks Score  (OT): 16  PT Discharge Summary  Anticipated Discharge Disposition (PT): sub acute care setting    Alecia Lawrence, PT  10/17/2023

## 2023-10-18 LAB — BACTERIA SPEC AEROBE CULT: ABNORMAL

## 2023-10-18 PROCEDURE — 97535 SELF CARE MNGMENT TRAINING: CPT | Performed by: OCCUPATIONAL THERAPIST

## 2023-10-18 PROCEDURE — 97116 GAIT TRAINING THERAPY: CPT

## 2023-10-18 PROCEDURE — 97530 THERAPEUTIC ACTIVITIES: CPT | Performed by: OCCUPATIONAL THERAPIST

## 2023-10-18 PROCEDURE — 25010000002 CEFTRIAXONE PER 250 MG: Performed by: NURSE PRACTITIONER

## 2023-10-18 PROCEDURE — 25010000002 SODIUM CHLORIDE 0.9 % WITH KCL 20 MEQ 20-0.9 MEQ/L-% SOLUTION: Performed by: FAMILY MEDICINE

## 2023-10-18 RX ORDER — CEFDINIR 300 MG/1
300 CAPSULE ORAL EVERY 12 HOURS SCHEDULED
Status: DISCONTINUED | OUTPATIENT
Start: 2023-10-19 | End: 2023-10-19 | Stop reason: HOSPADM

## 2023-10-18 RX ADMIN — BUSPIRONE HYDROCHLORIDE 10 MG: 10 TABLET ORAL at 05:42

## 2023-10-18 RX ADMIN — MEGESTROL ACETATE 200 MG: 40 SUSPENSION ORAL at 08:14

## 2023-10-18 RX ADMIN — PREGABALIN 75 MG: 75 CAPSULE ORAL at 08:13

## 2023-10-18 RX ADMIN — LISINOPRIL 20 MG: 20 TABLET ORAL at 08:13

## 2023-10-18 RX ADMIN — BUSPIRONE HYDROCHLORIDE 10 MG: 10 TABLET ORAL at 14:15

## 2023-10-18 RX ADMIN — METOPROLOL TARTRATE 25 MG: 25 TABLET, FILM COATED ORAL at 08:13

## 2023-10-18 RX ADMIN — CITALOPRAM 10 MG: 10 TABLET, FILM COATED ORAL at 08:13

## 2023-10-18 RX ADMIN — POTASSIUM CHLORIDE AND SODIUM CHLORIDE 50 ML/HR: 900; 150 INJECTION, SOLUTION INTRAVENOUS at 16:09

## 2023-10-18 RX ADMIN — SODIUM CHLORIDE 1000 MG: 900 INJECTION INTRAVENOUS at 14:16

## 2023-10-18 RX ADMIN — HYDROCHLOROTHIAZIDE 12.5 MG: 25 TABLET ORAL at 08:13

## 2023-10-18 RX ADMIN — PREGABALIN 75 MG: 75 CAPSULE ORAL at 20:25

## 2023-10-18 RX ADMIN — METOPROLOL TARTRATE 25 MG: 25 TABLET, FILM COATED ORAL at 20:25

## 2023-10-18 RX ADMIN — Medication 1000 MCG: at 08:13

## 2023-10-18 RX ADMIN — BUSPIRONE HYDROCHLORIDE 10 MG: 10 TABLET ORAL at 21:01

## 2023-10-18 RX ADMIN — Medication 10 ML: at 08:14

## 2023-10-18 NOTE — PLAN OF CARE
Goal Outcome Evaluation:  Plan of Care Reviewed With: patient        Progress: no change   Pt alert to person only.  IV fluids infusing. Turned and cleaned as needed.   Bed check on and safety maintained. Rested all night without waking. Dressing to right knee, left ankle and left lower arm clean dry and intact. Voiding with purewick.

## 2023-10-18 NOTE — PROGRESS NOTES
[facility] Medicine Services  INPATIENT PROGRESS NOTE    Patient Name: Kim Davison  Date of Admission: 10/16/2023  Today's Date: 10/18/23  Length of Stay: 2  Primary Care Physician: Winifred Kramer APRN    Subjective   Chief Complaint: Weakness  HPI   Ms. Davison is an 87-year-old female who presented to [facility] on 10/16 increased falls, urinary urgency and dysuria.  She lives at home with her spouse.  She normally uses a walker to ambulate.  Over the last 2 to 3 weeks she has had increasing difficulty with walking and has been having increased falls.  The ED, she was found to have an acute urinary tract infection.  Blood pressure as high as 206/80.  CT head interpreted by radiology showed no acute findings.  Chest x-ray interpreted by radiology showed no acute findings.    Sitting up in bed with family at bedside.  She tells me that she is feeling well.  Family at bedside also feels that she is doing better today.  Urine culture shows Proteus.  Blood culture with no growth to date.  Family tells me that she was able to walk in the hallway yesterday afternoon.  They are in agreement that patient will need rehab. She will likely be able to go to [place] tomorrow after third night inpatient stay.    Review of Systems   All pertinent negatives and positives are as above. All other systems have been reviewed and are negative unless otherwise stated.     Objective    Temp:  [97.4 °F (36.3 °C)-98 °F (36.7 °C)] 97.8 °F (36.6 °C)  Heart Rate:  [62-77] 66  Resp:  [16-17] 16  BP: (102-162)/(50-73) 162/73  Physical Exam  Vitals reviewed.   Constitutional:       General: She is not in acute distress.     Appearance: She is not toxic-appearing.      Comments: Sitting up in bed.  No acute distress.  On room air.  Family at bedside.   HENT:      Head: Normocephalic and atraumatic.      Mouth/Throat:      Mouth: Mucous membranes are moist.      Pharynx: Oropharynx is  "clear.   Eyes:      Extraocular Movements: Extraocular movements intact.      Conjunctiva/sclera: Conjunctivae normal.      Pupils: Pupils are equal, round, and reactive to light.   Cardiovascular:      Rate and Rhythm: Normal rate and regular rhythm.      Pulses: Normal pulses.   Pulmonary:      Effort: Pulmonary effort is normal. No respiratory distress.      Breath sounds: Normal breath sounds.   Abdominal:      General: Bowel sounds are normal. There is no distension.      Palpations: Abdomen is soft.      Tenderness: There is no abdominal tenderness.   Musculoskeletal:         General: No swelling or tenderness. Normal range of motion.      Cervical back: Normal range of motion and neck supple. No muscular tenderness.   Skin:     General: Skin is warm and dry.      Findings: No erythema or rash.   Neurological:      General: No focal deficit present.      Mental Status: She is alert.      Cranial Nerves: No cranial nerve deficit.      Motor: No weakness.      Comments: Oriented to self and knows that she is in the hospital.   Psychiatric:         Mood and Affect: Mood normal.         Behavior: Behavior normal.       Results Review:  I have reviewed the labs, radiology results, and diagnostic studies.    Laboratory Data:   Results from last 7 days   Lab Units 10/17/23  0552 10/16/23  1203   WBC 10*3/mm3 5.68 5.20   HEMOGLOBIN g/dL 12.5 13.0   HEMATOCRIT % 37.4 39.9   PLATELETS 10*3/mm3 386 397        Results from last 7 days   Lab Units 10/17/23  0552 10/16/23  1203   SODIUM mmol/L 137 140   POTASSIUM mmol/L 3.6 4.1   CHLORIDE mmol/L 104 105   CO2 mmol/L 22.0 25.0   BUN mg/dL 13 21   CREATININE mg/dL 0.59 0.89   CALCIUM mg/dL 8.9 9.4   BILIRUBIN mg/dL 0.6 0.5   ALK PHOS U/L 108 114   ALT (SGPT) U/L 7 8   AST (SGOT) U/L 10 11   GLUCOSE mg/dL 106* 103*       Culture Data:   Blood Culture   Date Value Ref Range Status   10/16/2023 No growth at 24 hours  Preliminary     No results found for: \"BCIDPCR\", \"CXREFLEX\", " "\"CSFCX\", \"CULTURETIS\"  No results found for: \"CULTURES\", \"HSVCX\", \"URCX\"  No results found for: \"EYECULTURE\", \"GCCX\", \"HSVCULTURE\", \"LABHSV\"  No results found for: \"LEGIONELLA\", \"MRSACX\", \"MUMPSCX\", \"MYCOPLASCX\"  No results found for: \"NOCARDIACX\", \"STOOLCX\"  Urine Culture   Date Value Ref Range Status   10/16/2023 >100,000 CFU/mL Gram Negative Bacilli (A)  Preliminary     No results found for: \"VIRALCULTU\", \"WOUNDCX\"    Radiology Data:   Imaging Results (Last 24 Hours)       ** No results found for the last 24 hours. **            I have reviewed the patient's current medications.     Assessment/Plan   Assessment  Active Hospital Problems    Diagnosis     **Decreased activities of daily living (ADL)     Urinary tract infection without hematuria     Hypertension        Treatment Plan  Urinary tract infection without hematuria.  Urine culture shows Proteus.  We will transition ceftriaxone to Omnicef to complete a total of 7 days antibiotic therapy.  Blood culture with no growth to date.  No fever, normal WBC and procalcitonin.    Blood pressure as high as 194/87 in ED.  Blood pressure this morning before medications 162/73.  Continue Lopressor, hydrochlorothiazide.  She was started on lisinopril 20 mg.  Continue to monitor blood pressure trend.    Continue PT/OT.  Therapy recommends skilled nursing facility at discharge.    SCDs for DVT prophylaxis.    Medical Decision Making  Number and Complexity of problems:   Decreased activities of daily living, moderate complexity  Urinary tract infection moderate, complexity  Hypertensive urgency, moderate complexity  Differential Diagnosis: none considered at present    Conditions and Status        Condition is improving.     MDM Data  External documents reviewed: prior [facility] records  Cardiac tracing (EKG, telemetry) interpretation: Normal sinus rhythm  Radiology interpretation: Interpreted by radiology  Labs reviewed: As above  Any tests that were considered but not ordered: " None considered at present     Decision rules/scores evaluated (example [id number], [place], etc): None considered at present     Discussed with: patient and Dr. Mcneill.     Care Planning  Shared decision making: Patient is agreeable to ongoing work-up and treatment  Code status and discussions: Full code with full interventions    Disposition  Social Determinants of Health that impact treatment or disposition: Patient lives with spouse in [place] -they are agreeable to rehab close to home.  She will likely be able to go to [place] tomorrow after third night inpatient stay.  I expect the patient to be discharged to [place] tomorrow.     Electronically signed by RAVEN Walker, 10/18/23, 10:25 CDT.

## 2023-10-18 NOTE — PROGRESS NOTES
[facility]  INPATIENT WOUND & OSTOMY CARE    PROGRESS NOTE    Today's Date: 10/18/23    Patient Name: Kim Davison  MRN: [id number]  CSN: [id number]  PCP: Winifred Kramer APRN  Referring Provider: ANGELA MCNEILL   Attending Provider: Angela Mcneill  Length of Stay: 2    SUBJECTIVE   Chief Complaint: ***    HPI: Kim Davison ***    Culture was taken on 10/10 of wound of left lower leg.  Culture result received from [facility] with results including Staphylococcus, coagulase negative (few growth) and Achromobacter xylosoxidans/denitrificans (few growth). She is currently on ceftriaxone which a. Xylosoxidans is resistant to. Did pass this along to RN for Dr. Mcneill decide if she would like to move forward with treatment. She has abx called into pharmacy by RAVEN Garcia on day of admission. Wound does not appear grossly infected.       Visit Dx:    ICD-10-CM ICD-9-CM   1. Decreased activities of daily living (ADL)  Z78.9 V49.89   2. Confusion  R41.0 298.9   3. Acute UTI (urinary tract infection)  N39.0 599.0   4. Wound of left lower extremity, sequela  S81.802S 906.1   5. Impaired functional mobility and activity tolerance [Z74.09]  Z74.09 V49.89       Hospital Problem List:     Decreased activities of daily living (ADL)    Hypertension    Urinary tract infection without hematuria      History:   Past Medical History:   Diagnosis Date    Abnormal EKG 2/1/2019    Atrioventricular block, Mobitz type 1, Wenckebach     Heart murmur     Hyperglycemia     Hypertension     Insomnia     Neuropathy     Orthostasis     Skin cancer     unknown what type    SVT (supraventricular tachycardia)     Syncope      Past Surgical History:   Procedure Laterality Date    APPENDECTOMY  03/20/2007    BREAST BIOPSY      CATARACT EXTRACTION      with lens implants    HEMORRHOIDECTOMY  03/20/2007    LUMBAR SPINE SURGERY  2002    SKIN CANCER EXCISION       Social History     Socioeconomic  History    Marital status:    Tobacco Use    Smoking status: Every Day     Packs/day: 0.25     Years: 60.00     Additional pack years: 0.00     Total pack years: 15.00     Types: Cigarettes     Start date: 1956    Smokeless tobacco: Never    Tobacco comments:     smoking 4-5 cigs per day (had been 1-3 per day at last visit), smoked less than 1/2 ppd for years - only able to completely quit for 2-3 months at a time, quit several times   Vaping Use    Vaping Use: Never used   Substance and Sexual Activity    Alcohol use: No    Drug use: No    Sexual activity: Defer       Allergies:  No Known Allergies    Medications:    Current Facility-Administered Medications:     acetaminophen (TYLENOL) tablet 650 mg, 650 mg, Oral, Q4H PRN, Angela Mcneill    sennosides-docusate (PERICOLACE) 8.6-50 MG per tablet 2 tablet, 2 tablet, Oral, Nightly PRN **AND** polyethylene glycol (MIRALAX) packet 17 g, 17 g, Oral, Daily PRN **AND** bisacodyl (DULCOLAX) EC tablet 5 mg, 5 mg, Oral, Daily PRN **AND** bisacodyl (DULCOLAX) suppository 10 mg, 10 mg, Rectal, Daily PRN, Angela Mcneill    busPIRone (BUSPAR) tablet 10 mg, 10 mg, Oral, Q8H, Angela Mcneill, 10 mg at 10/18/23 0542    cefTRIAXone (ROCEPHIN) 1,000 mg in sodium chloride 0.9 % 100 mL IVPB, 1,000 mg, Intravenous, Q24H, Angela Mcneill, Last Rate: 200 mL/hr at 10/17/23 1323, 1,000 mg at 10/17/23 1323    citalopram (CeleXA) tablet 10 mg, 10 mg, Oral, Daily, Angela Mcneill, 10 mg at 10/17/23 0925    hydroCHLOROthiazide (HYDRODIURIL) tablet 12.5 mg, 12.5 mg, Oral, Daily, Angela Mcneill, 12.5 mg at 10/17/23 0924    hydrOXYzine (ATARAX) tablet 10 mg, 10 mg, Oral, TID PRN, Angela Mcneill    lisinopril (PRINIVIL,ZESTRIL) tablet 20 mg, 20 mg, Oral, Q24H, Angela Mcneill, 20 mg at 10/17/23 0923    megestrol (MEGACE) 40 MG/ML suspension 200 mg, 200 mg, Oral, Daily, Angela Mcneill, 200 mg at 10/17/23 0926    metoprolol tartrate (LOPRESSOR) tablet 25  mg, 25 mg, Oral, Q12H, Angela Mcneill, 25 mg at 10/17/23 2237    ondansetron (ZOFRAN) tablet 4 mg, 4 mg, Oral, Q6H PRN, Angela Mcneill    pregabalin (LYRICA) capsule 75 mg, 75 mg, Oral, BID, Angela Mcneill, 75 mg at 10/17/23 2237    [COMPLETED] Insert Peripheral IV, , , Once **AND** sodium chloride 0.9 % flush 10 mL, 10 mL, Intravenous, PRN, Oswaldo Guerin MD    sodium chloride 0.9 % flush 10 mL, 10 mL, Intravenous, Q12H, Angela Mcneill, 10 mL at 10/17/23 2237    sodium chloride 0.9 % flush 10 mL, 10 mL, Intravenous, PRN, Angela Mcneill    sodium chloride 0.9 % infusion 40 mL, 40 mL, Intravenous, PRN, Angela Mcneill    sodium chloride 0.9 % with KCl 20 mEq/L infusion, 50 mL/hr, Intravenous, Continuous, Angela Mcneill, Last Rate: 50 mL/hr at 10/17/23 1324, 50 mL/hr at 10/17/23 1324    vitamin B-12 (CYANOCOBALAMIN) tablet 1,000 mcg, 1,000 mcg, Oral, Daily, Angela Mcneill, 1,000 mcg at 10/17/23 0922    zolpidem (AMBIEN) tablet 10 mg, 10 mg, Oral, Nightly PRN, Abraham Manuel MD, 10 mg at 10/17/23 0206    Review of Systems:  ***  Review of Systems      OBJECTIVE     Vitals:    10/18/23 0723   BP: 162/73   Pulse: 66   Resp: 16   Temp: 97.8 °F (36.6 °C)   SpO2: 96%       PHYSICAL EXAM  Pt presents ***.  Physical Exam       Results Review:  Lab Results (last 48 hours)       Procedure Component Value Units Date/Time    Blood Culture - Blood, Arm, Left [[id number]]  (Normal) Collected: 10/16/23 1240    Specimen: Blood from Arm, Left Updated: 10/17/23 1301     Blood Culture No growth at 24 hours    Blood Culture - Blood, Arm, Right [[id number]]  (Normal) Collected: 10/16/23 1203    Specimen: Blood from Arm, Right Updated: 10/17/23 1215     Blood Culture No growth at 24 hours    Urine Culture - Urine, Straight Cath [[id number]]  (Abnormal) Collected: 10/16/23 1223    Specimen: Urine from Straight Cath Updated: 10/17/23 0845     Urine Culture >100,000 CFU/mL Gram Negative Bacilli     Narrative:      Colonization of the urinary tract without infection is common. Treatment is discouraged unless the patient is symptomatic, pregnant, or undergoing an invasive urologic procedure.    Comprehensive Metabolic Panel [[id number]]  (Abnormal) Collected: 10/17/23 0552    Specimen: Blood Updated: 10/17/23 0626     Glucose 106 mg/dL      BUN 13 mg/dL      Creatinine 0.59 mg/dL      Sodium 137 mmol/L      Potassium 3.6 mmol/L      Chloride 104 mmol/L      CO2 22.0 mmol/L      Calcium 8.9 mg/dL      Total Protein 6.1 g/dL      Albumin 3.3 g/dL      ALT (SGPT) 7 U/L      AST (SGOT) 10 U/L      Alkaline Phosphatase 108 U/L      Total Bilirubin 0.6 mg/dL      Globulin 2.8 gm/dL      A/G Ratio 1.2 g/dL      BUN/Creatinine Ratio 22.0     Anion Gap 11.0 mmol/L      eGFR 87.9 mL/min/1.73     Narrative:      GFR Normal >60  Chronic Kidney Disease <60  Kidney Failure <15    The GFR formula is only valid for adults with stable renal function between ages 18 and 70.    CBC Auto Differential [[id number]]  (Abnormal) Collected: 10/17/23 0552    Specimen: Blood Updated: 10/17/23 0604     WBC 5.68 10*3/mm3      RBC 4.29 10*6/mm3      Hemoglobin 12.5 g/dL      Hematocrit 37.4 %      MCV 87.2 fL      MCH 29.1 pg      MCHC 33.4 g/dL      RDW 13.0 %      RDW-SD 41.6 fl      MPV 8.5 fL      Platelets 386 10*3/mm3      Neutrophil % 55.9 %      Lymphocyte % 29.0 %      Monocyte % 9.3 %      Eosinophil % 4.0 %      Basophil % 0.9 %      Immature Grans % 0.9 %      Neutrophils, Absolute 3.17 10*3/mm3      Lymphocytes, Absolute 1.65 10*3/mm3      Monocytes, Absolute 0.53 10*3/mm3      Eosinophils, Absolute 0.23 10*3/mm3      Basophils, Absolute 0.05 10*3/mm3      Immature Grans, Absolute 0.05 10*3/mm3      nRBC 0.0 /100 WBC     Urinalysis With Culture If Indicated - Straight Cath [[id number]]  (Abnormal) Collected: 10/16/23 1223    Specimen: Urine from Straight Cath Updated: 10/16/23 1241     Color, UA Dark Yellow     Appearance, UA  "Turbid     pH, UA 8.5     Specific Gravity, UA 1.019     Glucose, UA Negative     Ketones, UA Trace     Bilirubin, UA Negative     Blood, UA Trace     Protein, UA >=300 mg/dL (3+)     Leuk Esterase, UA Large (3+)     Nitrite, UA Positive     Urobilinogen, UA 1.0 E.U./dL    Narrative:      In absence of clinical symptoms, the presence of pyuria, bacteria, and/or nitrites on the urinalysis result does not correlate with infection.    Urinalysis, Microscopic Only - Straight Cath [[id number]]  (Abnormal) Collected: 10/16/23 1223    Specimen: Urine from Straight Cath Updated: 10/16/23 1241     RBC, UA 6-10 /HPF      WBC, UA Too Numerous to Count /HPF      Bacteria, UA 4+ /HPF      Squamous Epithelial Cells, UA 3-6 /HPF      Methodology Manual Light Microscopy    Procalcitonin [[id number]]  (Normal) Collected: 10/16/23 1203    Specimen: Blood Updated: 10/16/23 1237     Procalcitonin 0.06 ng/mL     Narrative:      As a Marker for Sepsis (Non-Neonates):    1. <0.5 ng/mL represents a low risk of severe sepsis and/or septic shock.  2. >2 ng/mL represents a high risk of severe sepsis and/or septic shock.    As a Marker for Lower Respiratory Tract Infections that require antibiotic therapy:    PCT on Admission    Antibiotic Therapy       6-12 Hrs later    >0.5                Strongly Recommended  >0.25 - <0.5        Recommended   0.1 - 0.25          Discouraged              Remeasure/reassess PCT  <0.1                Strongly Discouraged     Remeasure/reassess PCT    As 28 day mortality risk marker: \"Change in Procalcitonin Result\" (>80% or <=80%) if Day 0 (or Day 1) and Day 4 values are available. Refer to http://[email or web address]-pct-[email or web address]    Change in PCT <=80%  A decrease of PCT levels below or equal to 80% defines a positive change in PCT test result representing a higher risk for 28-day all-cause mortality of patients diagnosed with severe sepsis for septic shock.    Change in PCT >80%  A decrease of PCT levels of more " than 80% defines a negative change in PCT result representing a lower risk for 28-day all-cause mortality of patients diagnosed with severe sepsis or septic shock.       Comprehensive Metabolic Panel [[id number]]  (Abnormal) Collected: 10/16/23 1203    Specimen: Blood Updated: 10/16/23 1231     Glucose 103 mg/dL      BUN 21 mg/dL      Creatinine 0.89 mg/dL      Sodium 140 mmol/L      Potassium 4.1 mmol/L      Comment: Slight hemolysis detected by analyzer. Results may be affected.        Chloride 105 mmol/L      CO2 25.0 mmol/L      Calcium 9.4 mg/dL      Total Protein 6.5 g/dL      Albumin 3.7 g/dL      ALT (SGPT) 8 U/L      AST (SGOT) 11 U/L      Alkaline Phosphatase 114 U/L      Total Bilirubin 0.5 mg/dL      Globulin 2.8 gm/dL      A/G Ratio 1.3 g/dL      BUN/Creatinine Ratio 23.6     Anion Gap 10.0 mmol/L      eGFR 63.2 mL/min/1.73     Narrative:      GFR Normal >60  Chronic Kidney Disease <60  Kidney Failure <15    The GFR formula is only valid for adults with stable renal function between ages 18 and 70.    Magnesium [[id number]]  (Normal) Collected: 10/16/23 1203    Specimen: Blood Updated: 10/16/23 1231     Magnesium 2.1 mg/dL     Lactic Acid, Plasma [[id number]]  (Normal) Collected: 10/16/23 1203    Specimen: Blood Updated: 10/16/23 1229     Lactate 1.0 mmol/L     CBC & Differential [[id number]]  (Abnormal) Collected: 10/16/23 1203    Specimen: Blood Updated: 10/16/23 1212    Narrative:      The following orders were created for panel order CBC & Differential.  Procedure                               Abnormality         Status                     ---------                               -----------         ------                     CBC Auto Differential[[id number]]        Abnormal            Final result                 Please view results for these tests on the individual orders.    CBC Auto Differential [[id number]]  (Abnormal) Collected: 10/16/23 1203    Specimen: Blood Updated: 10/16/23 1212     WBC 5.20  10*3/mm3      RBC 4.46 10*6/mm3      Hemoglobin 13.0 g/dL      Hematocrit 39.9 %      MCV 89.5 fL      MCH 29.1 pg      MCHC 32.6 g/dL      RDW 13.2 %      RDW-SD 42.7 fl      MPV 8.6 fL      Platelets 397 10*3/mm3      Neutrophil % 55.1 %      Lymphocyte % 30.0 %      Monocyte % 9.4 %      Eosinophil % 3.5 %      Basophil % 1.2 %      Immature Grans % 0.8 %      Neutrophils, Absolute 2.87 10*3/mm3      Lymphocytes, Absolute 1.56 10*3/mm3      Monocytes, Absolute 0.49 10*3/mm3      Eosinophils, Absolute 0.18 10*3/mm3      Basophils, Absolute 0.06 10*3/mm3      Immature Grans, Absolute 0.04 10*3/mm3      nRBC 0.0 /100 WBC           Imaging Results (Last 72 Hours)       Procedure Component Value Units Date/Time    CT Head Without Contrast [[id number]] Collected: 10/17/23 0808     Updated: 10/17/23 0815    Narrative:      EXAMINATION: CT HEAD WO CONTRAST-      10/17/2023 7:58 AM CDT     HISTORY: Mental status change, unknown cause; Z78.9-Other specified  health status; R41.0-Disorientation, unspecified; N39.0-Urinary tract  infection, site not specified; S81.802S-Unspecified open wound, left  lower leg, sequela     In order to have a CT radiation dose as low as reasonably achievable  Automated Exposure Control was utilized for adjustment of the mA and/or  KV according to patient size.     DLP in mGycm= 614     The CT scan of the head is performed without intravenous contrast  enhancement.     Images are acquired in axial plane and subsequent reconstruction in  coronal and sagittal planes.     There is no previous similar study for comparison.     There is no evidence of a mass. There is no midline shift.     There is no evidence of an intracranial hemorrhage or hematoma.     Significantly dilated ventricles are seen out of proportion to  moderately dilated basal cisterns and the cortical sulci which may  suggest an element of hydrocephalus in addition to chronic atrophy.     Scattered areas of chronic ischemia in the  white matter bilaterally. The  gray-white matter differentiation is maintained.     There is heavy calcification of the anterior falx.     The images reviewed in bone window show no evidence of a skull fracture.  Limited visualized paranasal sinuses and mastoid air cells are  unremarkable.       Impression:      1. No acute intracranial abnormality.  2. Chronic atrophy and communicating hydrocephalus.  3. 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                                This report was signed and finalized on 10/17/2023 8:12 AM CDT by Dr. Pratima Willis MD.       XR Chest 1 View [[id number]] Collected: 10/16/23 1248     Updated: 10/16/23 1254    Narrative:      EXAMINATION: XR CHEST 1 VW-     10/16/2023 12:38 PM CDT     HISTORY: Weakness     COMPARISON:  3/21/2021.     1 view chest x-ray.     Heart size is normal.  Aortic arch calcification.  The mediastinum is within normal limits.     The lungs are normally expanded with no pneumonia or pneumothorax.     No congestive failure changes.     Osteopenic bones.  High-grade degenerative change at each shoulder.       Impression:      1. No acute lung disease.           This report was signed and finalized on 10/16/2023 12:51 PM CDT by Dr. Leonardo Cutler MD.                  ASSESSMENT/PLAN       Examination and evaluation of wound(s) was performed.    DIAGNOSIS:   ***    PLAN:   ***    Discussed findings and treatment plan including risks, benefits, and treatment options with *** in detail. Patient agreed with treatment plan.      This document has been electronically signed by RAVEN Jhaveri on 10/18/2023 08:09 CDT     Time spent in face-to-face evaluation, chart review, planning and education totaled *** minutes with greater than 50% of time spent with patient and/or family and in coordination of care.  Counseling of patient and/or family includes {Counseling Options:77642}. {Time/Procedures:08788}

## 2023-10-18 NOTE — PLAN OF CARE
Goal Outcome Evaluation:           Progress: improving  Outcome Evaluation: Pt resting, IVF infusing, IV abx, family at bedside.

## 2023-10-18 NOTE — PLAN OF CARE
Goal Outcome Evaluation:  Plan of Care Reviewed With: patient, family        Progress: improving  Outcome Evaluation: PT tx complete. Performed seated AROM BLE x15. Stood Min A from chair. Amb 75' at a time w/ RW and CGA/Anisa. Pt needs assist w/ RW at times. Pt veers to the L. Cues needed to  her feet while walking, usman the R side. Pt also needed cues for upright posture. Will cont POC.

## 2023-10-18 NOTE — THERAPY TREATMENT NOTE
Acute Care - Physical Therapy Treatment Note  [facility]     Patient Name: Kim Davison  : 1935  MRN: [id number]  Today's Date: 10/18/2023   Onset of Illness/Injury or Date of Surgery: 10/16/23  Visit Dx:     ICD-10-CM ICD-9-CM   1. Decreased activities of daily living (ADL)  Z78.9 V49.89   2. Confusion  R41.0 298.9   3. Acute UTI (urinary tract infection)  N39.0 599.0   4. Wound of left lower extremity, sequela  S81.802S 906.1   5. Impaired functional mobility and activity tolerance [Z74.09]  Z74.09 V49.89     Patient Active Problem List   Diagnosis    Palpitations    Abnormal EKG    Hypertension    Shortness of breath    Atrioventricular block, Mobitz type 1, Wenckebach    SVT (supraventricular tachycardia)    Orthostasis    Weight loss, non-intentional    Tobacco use    Decreased activities of daily living (ADL)    Urinary tract infection without hematuria     Past Medical History:   Diagnosis Date    Abnormal EKG 2019    Atrioventricular block, Mobitz type 1, Wenckebach     Heart murmur     Hyperglycemia     Hypertension     Insomnia     Neuropathy     Orthostasis     Skin cancer     unknown what type    SVT (supraventricular tachycardia)     Syncope      Past Surgical History:   Procedure Laterality Date    APPENDECTOMY  2007    BREAST BIOPSY      CATARACT EXTRACTION      with lens implants    HEMORRHOIDECTOMY  2007    LUMBAR SPINE SURGERY  2002    SKIN CANCER EXCISION       PT Assessment (last 12 hours)       PT Evaluation and Treatment       Row Name 10/18/23 1335          Physical Therapy Time and Intention    Subjective Information no complaints  -TB     Document Type therapy note (daily note)  -TB     Mode of Treatment physical therapy  -TB       Row Name 10/18/23 9936          General Information    Existing Precautions/Restrictions fall  -TB       Row Name 10/18/23 0917          Pain    Pretreatment Pain Rating 0/10 - no pain  -TB     Posttreatment Pain Rating 0/10 - no pain   -TB       Row Name 10/18/23 1337          Bed Mobility    Comment, (Bed Mobility) Chair  -TB       Row Name 10/18/23 1337          Transfers    Transfers sit-stand transfer;stand-sit transfer  -TB       Row Name 10/18/23 1337          Sit-Stand Transfer    Sit-Stand [place] (Transfers) minimum assist (75% patient effort);verbal cues  -TB     Assistive Device (Sit-Stand Transfers) walker, front-wheeled  -TB       Row Name 10/18/23 1337          Stand-Sit Transfer    Stand-Sit [place] (Transfers) contact guard;verbal cues  -TB     Assistive Device (Stand-Sit Transfers) walker, front-wheeled  -TB       Row Name 10/18/23 1337          Gait/Stairs (Locomotion)    [place] Level (Gait) contact guard;minimum assist (75% patient effort)  Assist w/ RW at times  -TB     Assistive Device (Gait) walker, front-wheeled  -TB     Distance in Feet (Gait) 75'  x2  -TB     Deviations/Abnormal Patterns (Gait) gait speed decreased;stride length decreased;festinating/shuffling  -TB     Bilateral Gait Deviations forward flexed posture  -TB     Comment, (Gait/Stairs) Cues to  feet escpecially the R foot  -TB       Row Name 10/18/23 1337          Motor Skills    Therapeutic Exercise --  AROM BLE x15  -TB       Row Name             Wound 10/16/23 1529 Left distal arm Skin Tear    Wound - Properties Group Placement Date: 10/16/23  - Placement Time: 1529  -CH Present on Original Admission: Y  -CH Side: Left  -CH Orientation: distal  -CH Location: arm  -CH Primary Wound Type: Skin tear  -CH    Retired Wound - Properties Group Placement Date: 10/16/23  - Placement Time: 1529  -CH Present on Original Admission: Y  -CH Side: Left  -CH Orientation: distal  -CH Location: arm  -CH Primary Wound Type: Skin tear  -CH    Retired Wound - Properties Group Date first assessed: 10/16/23  - Time first assessed: 1529  -CH Present on Original Admission: Y  -CH Side: Left  -CH Location: arm  -CH Primary Wound Type: Skin tear  -CH       Row Name             Wound 10/16/23 1529 Right anterior knee Abrasion    Wound - Properties Group Placement Date: 10/16/23  -CH Placement Time: 1529  -CH Side: Right  -CH Orientation: anterior  -CH Location: knee  -CH Primary Wound Type: Abrasion  -CH    Retired Wound - Properties Group Placement Date: 10/16/23  -CH Placement Time: 1529  -CH Side: Right  -CH Orientation: anterior  -CH Location: knee  -CH Primary Wound Type: Abrasion  -CH    Retired Wound - Properties Group Date first assessed: 10/16/23  -CH Time first assessed: 1529  -CH Side: Right  -CH Location: knee  -CH Primary Wound Type: Abrasion  -CH      Row Name             Wound 10/17/23 1708 Left distal leg Skin Tear    Wound - Properties Group Placement Date: 10/17/23  -CM Placement Time: 1708  -CM Side: Left  -CM Orientation: distal  -CM Location: leg  -CM Primary Wound Type: Skin tear  -CM    Retired Wound - Properties Group Placement Date: 10/17/23  -CM Placement Time: 1708  -CM Side: Left  -CM Orientation: distal  -CM Location: leg  -CM Primary Wound Type: Skin tear  -CM    Retired Wound - Properties Group Date first assessed: 10/17/23  -CM Time first assessed: 1708  -CM Side: Left  -CM Location: leg  -CM Primary Wound Type: Skin tear  -CM      Row Name 10/18/23 1337          Plan of Care Review    Plan of Care Reviewed With patient;family  -TB     Progress improving  -TB     Outcome Evaluation PT tx complete. Performed seated AROM BLE x15. Stood Min A from chair. Amb 75' at a time w/ RW and CGA/Anisa. Pt needs assist w/ RW at times. Pt veers to the L. Cues needed to  her feet while walking, usman the R side. Pt also needed cues for upright posture. Will cont POC.  -TB       Row Name 10/18/23 9531          Positioning and Restraints    Pre-Treatment Position sitting in chair/recliner  -TB     Post Treatment Position chair  -TB     In Chair reclined;sitting;call light within reach;encouraged to call for assist;exit alarm on;with  family/caregiver;legs elevated  -TB               User Key  (r) = Recorded By, (t) = Taken By, (c) = Cosigned By      Initials Name Provider Type    Danitza Pedersen, RN Registered Nurse    Chu Berger, PTA Physical Therapist Assistant    Jeanette Watts, RN Registered Nurse                    Physical Therapy Education       Title: PT OT SLP Therapies (In Progress)       Topic: Physical Therapy (In Progress)       Point: Mobility training (Done)       Learning Progress Summary             Patient Acceptance, E,TB,D, VU,NR by  at 10/17/2023 1456    Comment: education re: purpose of PT/importance of activity, for safety/falls prevention, for improved technique w/ tfers and gait w/ rwx w/ emphasis on hand placement during transition and increase step length   Significant Other Acceptance, E,TB,D, VU,NR by  at 10/17/2023 1456    Comment: education re: purpose of PT/importance of activity, for safety/falls prevention, for improved technique w/ tfers and gait w/ rwx w/ emphasis on hand placement during transition and increase step length                         Point: Home exercise program (Not Started)       Learner Progress:  Not documented in this visit.              Point: Precautions (Done)       Learning Progress Summary             Patient Acceptance, E,TB,D, VU,NR by  at 10/17/2023 1456    Comment: education re: purpose of PT/importance of activity, for safety/falls prevention, for improved technique w/ tfers and gait w/ rwx w/ emphasis on hand placement during transition and increase step length   Significant Other Acceptance, E,TB,D, VU,NR by  at 10/17/2023 1456    Comment: education re: purpose of PT/importance of activity, for safety/falls prevention, for improved technique w/ tfers and gait w/ rwx w/ emphasis on hand placement during transition and increase step length                                         User Key       Initials Effective Dates Name Provider Type  Discipline    JE 08/02/18 -  Alecia Lawrence, PT Physical Therapist PT                  PT Recommendation and Plan     Plan of Care Reviewed With: patient, family  Progress: improving  Outcome Evaluation: PT tx complete. Performed seated AROM BLE x15. Stood Min A from chair. Amb 75' at a time w/ RW and CGA/Anisa. Pt needs assist w/ RW at times. Pt veers to the L. Cues needed to  her feet while walking, usman the R side. Pt also needed cues for upright posture. Will cont POC.   Outcome Measures       Row Name 10/18/23 1337 10/17/23 0900          How much help from another person do you currently need...    Turning from your back to your side while in flat bed without using bedrails? 3  -TB --     Moving from lying on back to sitting on the side of a flat bed without bedrails? 3  -TB --     Moving to and from a bed to a chair (including a wheelchair)? 3  -TB --     Standing up from a chair using your arms (e.g., wheelchair, bedside chair)? 3  -TB --     Climbing 3-5 steps with a railing? 2  -TB --     To walk in hospital room? 3  -TB --     AM-PAC 6 Clicks Score (PT) 17  -TB --     Highest level of mobility 5 --> Static standing  -TB --        How much help from another is currently needed...    Putting on and taking off regular lower body clothing? -- 2  -EC     Bathing (including washing, rinsing, and drying) -- 2  -EC     Toileting (which includes using toilet bed pan or urinal) -- 2  -EC     Putting on and taking off regular upper body clothing -- 3  -EC     Taking care of personal grooming (such as brushing teeth) -- 3  -EC     Eating meals -- 4  -EC     AM-PAC 6 Clicks Score (OT) -- 16  -EC        Functional Assessment    Outcome Measure Options AM-PAC 6 Clicks Basic Mobility (PT)  -TB AM-PAC 6 Clicks Daily Activity (OT)  -EC               User Key  (r) = Recorded By, (t) = Taken By, (c) = Cosigned By      Initials Name Provider Type    TB Chu Tatum, PTA Physical Therapist Assistant    EC  Windy Bianchi, OTR/L Occupational Therapist                     Time Calculation:    PT Charges       Row Name 10/18/23 1410             Time Calculation    Start Time 1337  -TB      Stop Time 1400  -TB      Time Calculation (min) 23 min  -TB      PT Received On 10/18/23  -TB         Time Calculation- PT    Total Timed Code Minutes- PT 23 minute(s)  -TB                User Key  (r) = Recorded By, (t) = Taken By, (c) = Cosigned By      Initials Name Provider Type    TB Chu Tatum PTA Physical Therapist Assistant                  Therapy Charges for Today       Code Description Service Date Service Provider Modifiers Qty    59706717313 HC GAIT TRAINING EA 15 MIN 10/18/2023 Chu Tatum PTA GP 2            PT G-Codes  Outcome Measure Options: AM-PAC 6 Clicks Basic Mobility (PT)  AM-PAC 6 Clicks Score (PT): 17  AM-PAC 6 Clicks Score (OT): 16    Chu Tatum PTA  10/18/2023

## 2023-10-18 NOTE — PLAN OF CARE
Problem: Adult Inpatient Plan of Care  Goal: Plan of Care Review  Recent Flowsheet Documentation  Taken 10/18/2023 1005 by Harsh Kingston, OTR/L  Progress: no change  Plan of Care Reviewed With: patient  Outcome Evaluation: OT tx completed. pt is agreeable to therapy. Pt with flat affect at times. pt requires additional time and cues for all tasks. Decreased safety awareness and attention to task noted. Pt was min A for supine to sit. Pt was max A with draw sheet for scooting at EOB. pt was max A for UB and LB dressing task. Pt was min A for sit to stand t/f. Pt was CGA-min A for standing balance and steps to chair. Continue OT POC.

## 2023-10-18 NOTE — THERAPY TREATMENT NOTE
Patient Name: Kim Davison  : 1935    MRN: [id number]                              Today's Date: 10/18/2023       Admit Date: 10/16/2023    Visit Dx:     ICD-10-CM ICD-9-CM   1. Decreased activities of daily living (ADL)  Z78.9 V49.89   2. Confusion  R41.0 298.9   3. Acute UTI (urinary tract infection)  N39.0 599.0   4. Wound of left lower extremity, sequela  S81.802S 906.1   5. Impaired functional mobility and activity tolerance [Z74.09]  Z74.09 V49.89     Patient Active Problem List   Diagnosis    Palpitations    Abnormal EKG    Hypertension    Shortness of breath    Atrioventricular block, Mobitz type 1, Wenckebach    SVT (supraventricular tachycardia)    Orthostasis    Weight loss, non-intentional    Tobacco use    Decreased activities of daily living (ADL)    Urinary tract infection without hematuria     Past Medical History:   Diagnosis Date    Abnormal EKG 2019    Atrioventricular block, Mobitz type 1, Wenckebach     Heart murmur     Hyperglycemia     Hypertension     Insomnia     Neuropathy     Orthostasis     Skin cancer     unknown what type    SVT (supraventricular tachycardia)     Syncope      Past Surgical History:   Procedure Laterality Date    APPENDECTOMY  2007    BREAST BIOPSY      CATARACT EXTRACTION      with lens implants    HEMORRHOIDECTOMY  2007    LUMBAR SPINE SURGERY  2002    SKIN CANCER EXCISION        General Information       Row Name 10/18/23 1005          OT Time and Intention    Document Type therapy note (daily note)  -MM     Mode of Treatment occupational therapy  -MM       Row Name 10/18/23 1005          General Information    Patient Profile Reviewed yes  -MM     Existing Precautions/Restrictions fall  -MM     Barriers to Rehab medically complex;previous functional deficit;cognitive status;impaired sensation  -MM       Row Name 10/18/23 1005          Safety Issues, Functional Mobility    Safety Issues Affecting Function (Mobility) ability to follow  commands;at risk behavior observed;awareness of need for assistance;friction/shear risk;insight into deficits/self-awareness;judgment;positioning of assistive device;problem-solving;safety precaution awareness;safety precautions follow-through/compliance;sequencing abilities  -MM     Impairments Affecting Function (Mobility) balance;cognition;endurance/activity tolerance;strength;sensation/sensory awareness  -MM     Cognitive Impairments, Mobility Safety/Performance attention;awareness, need for assistance;insight into deficits/self-awareness;judgment;problem-solving/reasoning;safety precaution awareness;safety precaution follow-through;sequencing abilities  -MM               User Key  (r) = Recorded By, (t) = Taken By, (c) = Cosigned By      Initials Name Provider Type    MM Harsh Kingston, OTR/L Occupational Therapist                     Mobility/ADL's       Row Name 10/18/23 1005          Bed Mobility    Bed Mobility supine-sit;scooting/bridging  -MM     Scooting/Bridging [place] (Bed Mobility) maximum assist (25% patient effort);verbal cues  -MM     Supine-Sit [place] (Bed Mobility) minimum assist (75% patient effort);verbal cues  -MM     Assistive Device (Bed Mobility) bed rails;head of bed elevated;draw sheet  -MM       Row Name 10/18/23 1005          Transfers    Transfers sit-stand transfer;stand-sit transfer  -MM       Row Name 10/18/23 1005          Sit-Stand Transfer    Sit-Stand [place] (Transfers) minimum assist (75% patient effort);verbal cues  -MM     Assistive Device (Sit-Stand Transfers) walker, front-wheeled  -MM       Row Name 10/18/23 1005          Stand-Sit Transfer    Stand-Sit [place] (Transfers) minimum assist (75% patient effort);verbal cues  -MM     Assistive Device (Stand-Sit Transfers) walker, front-wheeled  -MM       Row Name 10/18/23 1005          Functional Mobility    Functional Mobility- Ind. Level contact guard assist;verbal cues required  -MM     Functional  Mobility- Device walker, front-wheeled  -MM     Functional Mobility- Comment bed to chair  -MM       [facility] Name 10/18/23 1005          Activities of Daily Living    BADL Assessment/Intervention lower body dressing;upper body dressing  -MM       [facility] Name 10/18/23 [facility]5          Lower Body Dressing Assessment/Training    [place] Level (Lower Body Dressing) socks;maximum assist (25% patient effort);verbal cues  adjust socks  -MM     Position (Lower Body Dressing) edge of bed sitting  -MM       Row Name 10/18/23 1005          Upper Body Dressing Assessment/Training    [place] Level (Upper Body Dressing) maximum assist (25% patient effort);verbal cues  adjust gown  -MM     Position (Upper Body Dressing) edge of bed sitting;supported standing  -MM               User Key  (r) = Recorded By, (t) = Taken By, (c) = Cosigned By      Initials Name Provider Type    Harsh Cuellar, OTR/L Occupational Therapist                   Obj/Interventions       Row Name 10/18/23 1005          Balance    Balance Assessment sitting static balance;sitting dynamic balance;standing static balance;standing dynamic balance  -MM     Static Sitting Balance standby assist  -MM     Dynamic Sitting Balance standby assist  -MM     Position, Sitting Balance supported;sitting in chair;unsupported;sitting edge of bed  -MM     Static Standing Balance contact guard;minimal assist;verbal cues  -MM     Dynamic Standing Balance contact guard;minimal assist;verbal cues  -MM     Position/Device Used, Standing Balance supported;walker, front-wheeled  -MM               User Key  (r) = Recorded By, (t) = Taken By, (c) = Cosigned By      Initials Name Provider Type    Harsh Cuellar, OTR/L Occupational Therapist                   Goals/Plan    No documentation.                  Clinical Impression       Row Name 10/18/23 1005          Pain Assessment    Pretreatment Pain Rating 0/10 - no pain  -MM     Posttreatment Pain Rating 0/10 - no pain  -MM        Row Name 10/18/23 1005          Plan of Care Review    Plan of Care Reviewed With patient  -MM     Progress no change  -MM     Outcome Evaluation OT tx completed. pt is agreeable to therapy. Pt with flat affect at times. pt requires additional time and cues for all tasks. Decreased safety awareness and attention to task noted. Pt was min A for supine to sit. Pt was max A with draw sheet for scooting at EOB. pt was max A for UB and LB dressing task. Pt was min A for sit to stand t/f. Pt was CGA-min A for standing balance and steps to chair. Continue OT POC.  -MM       Row Name 10/18/23 1005          Positioning and Restraints    Pre-Treatment Position in bed  -MM     Post Treatment Position chair  -MM     In Chair notified nsg;reclined;call light within reach;encouraged to call for assist;exit alarm on;legs elevated  -MM               User Key  (r) = Recorded By, (t) = Taken By, (c) = Cosigned By      Initials Name Provider Type    MM Harsh Kingston, OTR/L Occupational Therapist                   Outcome Measures       Row Name 10/18/23 1005          How much help from another is currently needed...    Putting on and taking off regular lower body clothing? 2  -MM     Bathing (including washing, rinsing, and drying) 2  -MM     Toileting (which includes using toilet bed pan or urinal) 2  -MM     Putting on and taking off regular upper body clothing 2  -MM     Taking care of personal grooming (such as brushing teeth) 3  -MM     Eating meals 4  -MM     AM-PAC 6 Clicks Score (OT) 15  -MM       Row Name 10/18/23 1337 10/18/23 0813       How much help from another person do you currently need...    Turning from your back to your side while in flat bed without using bedrails? 3  -TB 3  -MT    Moving from lying on back to sitting on the side of a flat bed without bedrails? 3  -TB 3  -MT    Moving to and from a bed to a chair (including a wheelchair)? 3  -TB 3  -MT    Standing up from a chair using your arms (e.g.,  wheelchair, bedside chair)? 3  -TB 3  -MT    Climbing 3-5 steps with a railing? 2  -TB 2  -MT    To walk in hospital room? 3  -TB 2  -MT    AM-PAC 6 Clicks Score (PT) 17  -TB 16  -MT    Highest level of mobility 5 --> Static standing  -TB 5 --> Static standing  -MT      Row Name 10/18/23 1337 10/18/23 1005       Functional Assessment    Outcome Measure Options AM-PAC 6 Clicks Basic Mobility (PT)  -TB AM-PAC 6 Clicks Daily Activity (OT)  -MM              User Key  (r) = Recorded By, (t) = Taken By, (c) = Cosigned By      Initials Name Provider Type    MM Harsh Kingston E, OTR/L Occupational Therapist    TB Chu Tatum, PTA Physical Therapist Assistant    MT Cody Horn, RN Registered Nurse                    Occupational Therapy Education       Title: PT OT SLP Therapies (In Progress)       Topic: Occupational Therapy (In Progress)       Point: ADL training (In Progress)       Description:   Instruct learner(s) on proper safety adaptation and remediation techniques during self care or transfers.   Instruct in proper use of assistive devices.                  Learning Progress Summary             Patient Acceptance, E, NR by MM at 10/18/2023 1442    Acceptance, E, VU,NR by EC at 10/17/2023 0942    Comment: role of OT, fall prevention, safety mechanics                         Point: Precautions (In Progress)       Description:   Instruct learner(s) on prescribed precautions during self-care and functional transfers.                  Learning Progress Summary             Patient Acceptance, E, NR by MM at 10/18/2023 1442    Acceptance, E, VU,NR by EC at 10/17/2023 0942    Comment: role of OT, fall prevention, safety mechanics                         Point: Body mechanics (In Progress)       Description:   Instruct learner(s) on proper positioning and spine alignment during self-care, functional mobility activities and/or exercises.                  Learning Progress Summary             Patient Acceptance,  E, NR by  at 10/18/2023 1442    Acceptance, E, VU,NR by  at 10/17/2023 0942    Comment: role of OT, fall prevention, safety mechanics                                         User Key       Initials Effective Dates Name Provider Type Discipline     07/11/23 -  Harsh Kingston, OTR/L Occupational Therapist OT     10/13/23 -  Windy Bianchi OTR/L Occupational Therapist OT                  OT Recommendation and Plan     Plan of Care Review  Plan of Care Reviewed With: patient  Progress: no change  Outcome Evaluation: OT tx completed. pt is agreeable to therapy. Pt with flat affect at times. pt requires additional time and cues for all tasks. Decreased safety awareness and attention to task noted. Pt was min A for supine to sit. Pt was max A with draw sheet for scooting at EOB. pt was max A for UB and LB dressing task. Pt was min A for sit to stand t/f. Pt was CGA-min A for standing balance and steps to chair. Continue OT POC.     Time Calculation:         Time Calculation- OT       Row Name 10/18/23 1005             Time Calculation- OT    OT Start Time 1005  -MM      OT Stop Time 1045  -MM      OT Time Calculation (min) 40 min  -MM      Total Timed Code Minutes- OT 40 minute(s)  -MM      OT Received On 10/18/23  -MM         Timed Charges    48939 - OT Therapeutic Activity Minutes 32  -MM      32470 - OT Self Care/Mgmt Minutes 8  -MM         Total Minutes    Timed Charges Total Minutes 40  -MM       Total Minutes 40  -MM                User Key  (r) = Recorded By, (t) = Taken By, (c) = Cosigned By      Initials Name Provider Type     Harsh Kingston OTR/L Occupational Therapist                  Therapy Charges for Today       Code Description Service Date Service Provider Modifiers Qty    24892728206 HC OT SELF CARE/MGMT/TRAIN EA 15 MIN 10/18/2023 Harsh Kingston OTR/L GO 1    53042988703 HC OT THERAPEUTIC ACT EA 15 MIN 10/18/2023 Harsh Kingston OTR/L GO 2                 Harsh Kingston  OTR/L  10/18/2023

## 2023-10-18 NOTE — CASE MANAGEMENT/SOCIAL WORK
Continued Stay Note   [facility]     Patient Name: Kim Davison  MRN: [id number]  Today's Date: 10/18/2023    Admit Date: 10/16/2023    Plan: [facility]   Discharge Plan       Row Name 10/18/23 1118       Plan    Plan [facility]    Patient/Family in Agreement with Plan yes    Plan Comments [facility] has offered and can take pt tomorrow. Family updated. Will follow for d/c. Pharmacy has been changed.                   Discharge Codes    No documentation.                       [employer]

## 2023-10-19 VITALS
DIASTOLIC BLOOD PRESSURE: 76 MMHG | OXYGEN SATURATION: 100 % | HEART RATE: 73 BPM | WEIGHT: 118 LBS | RESPIRATION RATE: 16 BRPM | BODY MASS INDEX: 20.91 KG/M2 | SYSTOLIC BLOOD PRESSURE: 152 MMHG | HEIGHT: 63 IN | TEMPERATURE: 97.8 F
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
## 2023-10-19 PROBLEM — R41.3 MEMORY LOSS: Status: ACTIVE | Noted: 2023-10-19

## 2023-10-19 LAB
QT INTERVAL: 414 MS
QTC INTERVAL: 443 MS

## 2023-10-19 PROCEDURE — 97535 SELF CARE MNGMENT TRAINING: CPT

## 2023-10-19 PROCEDURE — 97116 GAIT TRAINING THERAPY: CPT

## 2023-10-19 RX ORDER — PREGABALIN 75 MG/1
75 CAPSULE ORAL 2 TIMES DAILY
Qty: 6 CAPSULE | Refills: 0 | Status: SHIPPED | OUTPATIENT
Start: 2023-10-19

## 2023-10-19 RX ORDER — CEFDINIR 300 MG/1
300 CAPSULE ORAL EVERY 12 HOURS SCHEDULED
Start: 2023-10-19 | End: 2023-10-23

## 2023-10-19 RX ORDER — ACETAMINOPHEN 325 MG/1
650 TABLET ORAL EVERY 4 HOURS PRN
Start: 2023-10-19

## 2023-10-19 RX ORDER — MEGESTROL ACETATE 40 MG/ML
200 SUSPENSION ORAL EVERY 8 HOURS
Start: 2023-10-19

## 2023-10-19 RX ADMIN — HYDROCHLOROTHIAZIDE 12.5 MG: 25 TABLET ORAL at 09:10

## 2023-10-19 RX ADMIN — CEFDINIR 300 MG: 300 CAPSULE ORAL at 09:10

## 2023-10-19 RX ADMIN — Medication 1000 MCG: at 09:10

## 2023-10-19 RX ADMIN — METOPROLOL TARTRATE 25 MG: 25 TABLET, FILM COATED ORAL at 09:10

## 2023-10-19 RX ADMIN — CITALOPRAM 10 MG: 10 TABLET, FILM COATED ORAL at 09:10

## 2023-10-19 RX ADMIN — LISINOPRIL 20 MG: 20 TABLET ORAL at 09:10

## 2023-10-19 RX ADMIN — Medication 10 ML: at 09:15

## 2023-10-19 RX ADMIN — PREGABALIN 75 MG: 75 CAPSULE ORAL at 09:10

## 2023-10-19 RX ADMIN — BUSPIRONE HYDROCHLORIDE 10 MG: 10 TABLET ORAL at 06:12

## 2023-10-19 RX ADMIN — MEGESTROL ACETATE 200 MG: 40 SUSPENSION ORAL at 09:11

## 2023-10-19 NOTE — THERAPY TREATMENT NOTE
Acute Care - Physical Therapy Treatment Note  [facility]     Patient Name: Kim Davison  : 1935  MRN: [id number]  Today's Date: 10/19/2023   Onset of Illness/Injury or Date of Surgery: 10/16/23  Visit Dx:     ICD-10-CM ICD-9-CM   1. Decreased activities of daily living (ADL)  Z78.9 V49.89   2. Confusion  R41.0 298.9   3. Acute UTI (urinary tract infection)  N39.0 599.0   4. Wound of left lower extremity, sequela  S81.802S 906.1   5. Impaired functional mobility and activity tolerance [Z74.09]  Z74.09 V49.89     Patient Active Problem List   Diagnosis    Palpitations    Abnormal EKG    Primary hypertension    Shortness of breath    Atrioventricular block, Mobitz type 1, Wenckebach    SVT (supraventricular tachycardia)    Orthostasis    Weight loss, non-intentional    Tobacco use    Decreased activities of daily living (ADL)    Urinary tract infection without hematuria due to Proteus mirabilis    Fall    Memory loss     Past Medical History:   Diagnosis Date    Abnormal EKG 2019    Atrioventricular block, Mobitz type 1, Wenckebach     Heart murmur     Hyperglycemia     Hypertension     Insomnia     Neuropathy     Orthostasis     Skin cancer     unknown what type    SVT (supraventricular tachycardia)     Syncope      Past Surgical History:   Procedure Laterality Date    APPENDECTOMY  2007    BREAST BIOPSY      CATARACT EXTRACTION      with lens implants    HEMORRHOIDECTOMY  2007    LUMBAR SPINE SURGERY  2002    SKIN CANCER EXCISION       PT Assessment (last 12 hours)       PT Evaluation and Treatment       Row Name 10/19/23 0931          Physical Therapy Time and Intention    Subjective Information no complaints  -TB     Document Type therapy note (daily note)  -TB     Mode of Treatment physical therapy  -TB       Row Name 10/19/23 0931          General Information    Existing Precautions/Restrictions fall  -TB       Row Name 10/19/23 0931          Pain    Pretreatment Pain Rating 0/10 - no  pain  -TB     Posttreatment Pain Rating 0/10 - no pain  -TB       Row Name 10/19/23 0931          Transfers    Transfers sit-stand transfer;stand-sit transfer  -TB       Row Name 10/19/23 0931          Sit-Stand Transfer    Sit-Stand Doran (Transfers) minimum assist (75% patient effort);verbal cues  -TB     Assistive Device (Sit-Stand Transfers) walker, front-wheeled  -TB       Row Name 10/19/23 0931          Stand-Sit Transfer    Stand-Sit Doran (Transfers) contact guard;verbal cues  -TB     Assistive Device (Stand-Sit Transfers) walker, front-wheeled  -TB       Row Name 10/19/23 0931          Gait/Stairs (Locomotion)    Doran Level (Gait) contact guard;minimum assist (75% patient effort)  Assist w/ RW at times  -TB     Assistive Device (Gait) walker, front-wheeled  -TB     Distance in Feet (Gait) 75  x2  -TB     Deviations/Abnormal Patterns (Gait) gait speed decreased;stride length decreased;festinating/shuffling  -TB     Bilateral Gait Deviations forward flexed posture  -TB       Row Name             Wound 10/16/23 1529 Left distal arm Skin Tear    Wound - Properties Group Placement Date: 10/16/23  - Placement Time: 1529  -CH Present on Original Admission: Y  -CH Side: Left  -CH Orientation: distal  -CH Location: arm  -CH Primary Wound Type: Skin tear  -CH    Retired Wound - Properties Group Placement Date: 10/16/23  - Placement Time: 1529  -CH Present on Original Admission: Y  -CH Side: Left  -CH Orientation: distal  -CH Location: arm  -CH Primary Wound Type: Skin tear  -CH    Retired Wound - Properties Group Date first assessed: 10/16/23  - Time first assessed: 1529  -CH Present on Original Admission: Y  -CH Side: Left  -CH Location: arm  -CH Primary Wound Type: Skin tear  -CH      Row Name             Wound 10/16/23 1529 Right anterior knee Abrasion    Wound - Properties Group Placement Date: 10/16/23  - Placement Time: 1529  -CH Side: Right  -CH Orientation: anterior  -CH  Location: knee  -CH Primary Wound Type: Abrasion  -CH    Retired Wound - Properties Group Placement Date: 10/16/23  -CH Placement Time: 1529  -CH Side: Right  -CH Orientation: anterior  -CH Location: knee  -CH Primary Wound Type: Abrasion  -CH    Retired Wound - Properties Group Date first assessed: 10/16/23  -CH Time first assessed: 1529  -CH Side: Right  -CH Location: knee  -CH Primary Wound Type: Abrasion  -CH      Row Name             Wound 10/17/23 1708 Left distal leg Skin Tear    Wound - Properties Group Placement Date: 10/17/23  -CM Placement Time: 1708  -CM Side: Left  -CM Orientation: distal  -CM Location: leg  -CM Primary Wound Type: Skin tear  -CM    Retired Wound - Properties Group Placement Date: 10/17/23  -CM Placement Time: 1708  -CM Side: Left  -CM Orientation: distal  -CM Location: leg  -CM Primary Wound Type: Skin tear  -CM    Retired Wound - Properties Group Date first assessed: 10/17/23  -CM Time first assessed: 1708  -CM Side: Left  -CM Location: leg  -CM Primary Wound Type: Skin tear  -CM      Row Name 10/19/23 0931          Positioning and Restraints    Pre-Treatment Position sitting in chair/recliner  -TB     Post Treatment Position chair  -TB     In Chair reclined;sitting;call light within reach;encouraged to call for assist;with family/caregiver;exit alarm on  -TB               User Key  (r) = Recorded By, (t) = Taken By, (c) = Cosigned By      Initials Name Provider Type    Danitza Pedersen, RN Registered Nurse    Chu Berger, DAVID Physical Therapist Assistant    Jeanette Watts, RN Registered Nurse                    Physical Therapy Education       Title: PT OT SLP Therapies (Resolved)       Topic: Physical Therapy (Resolved)       Point: Mobility training (Resolved)       Learning Progress Summary             Patient Acceptance, E,TB,D, VU,NR by KD at 10/17/2023 4771    Comment: education re: purpose of PT/importance of activity, for safety/falls prevention,  for improved technique w/ tfers and gait w/ rwx w/ emphasis on hand placement during transition and increase step length   Significant Other Acceptance, E,TB,D, VU,NR by KD at 10/17/2023 1456    Comment: education re: purpose of PT/importance of activity, for safety/falls prevention, for improved technique w/ tfers and gait w/ rwx w/ emphasis on hand placement during transition and increase step length                         Point: Home exercise program (Resolved)       Learner Progress:  Not documented in this visit.              Point: Precautions (Resolved)       Learning Progress Summary             Patient Acceptance, E,TB,D, VU,NR by KD at 10/17/2023 1456    Comment: education re: purpose of PT/importance of activity, for safety/falls prevention, for improved technique w/ tfers and gait w/ rwx w/ emphasis on hand placement during transition and increase step length   Significant Other Acceptance, E,TB,D, VU,NR by KD at 10/17/2023 1456    Comment: education re: purpose of PT/importance of activity, for safety/falls prevention, for improved technique w/ tfers and gait w/ rwx w/ emphasis on hand placement during transition and increase step length                                         User Key       Initials Effective Dates Name Provider Type Discipline     08/02/18 -  Alecia Lawrence, PT Physical Therapist PT                  PT Recommendation and Plan     Plan of Care Reviewed With: patient, family  Progress: improving  Outcome Evaluation: PT tx complete. Performed seated AROM BLE x15. Stood Min A from chair. Amb 75' at a time w/ RW and CGA/Anisa. Pt needs assist w/ RW at times. Pt veers to the L. Cues needed to  her feet while walking, usman the R side. Pt also needed cues for upright posture. Will cont POC.   Outcome Measures       Row Name 10/18/23 1337 10/17/23 0900          How much help from another person do you currently need...    Turning from your back to your side while in flat bed without  using bedrails? 3  -TB --     Moving from lying on back to sitting on the side of a flat bed without bedrails? 3  -TB --     Moving to and from a bed to a chair (including a wheelchair)? 3  -TB --     Standing up from a chair using your arms (e.g., wheelchair, bedside chair)? 3  -TB --     Climbing 3-5 steps with a railing? 2  -TB --     To walk in hospital room? 3  -TB --     AM-PAC 6 Clicks Score (PT) 17  -TB --     Highest level of mobility 5 --> Static standing  -TB --        How much help from another is currently needed...    Putting on and taking off regular lower body clothing? -- 2  -EC     Bathing (including washing, rinsing, and drying) -- 2  -EC     Toileting (which includes using toilet bed pan or urinal) -- 2  -EC     Putting on and taking off regular upper body clothing -- 3  -EC     Taking care of personal grooming (such as brushing teeth) -- 3  -EC     Eating meals -- 4  -EC     AM-PAC 6 Clicks Score (OT) -- 16  -EC        Functional Assessment    Outcome Measure Options AM-PAC 6 Clicks Basic Mobility (PT)  -TB AM-PAC 6 Clicks Daily Activity (OT)  -EC               User Key  (r) = Recorded By, (t) = Taken By, (c) = Cosigned By      Initials Name Provider Type    Chu Berger PTA Physical Therapist Assistant    EC Windy Bianchi, OTR/L Occupational Therapist                     Time Calculation:    PT Charges       Row Name 10/19/23 1529             Time Calculation    Start Time 0931  -TB      Stop Time 0955  -TB      Time Calculation (min) 24 min  -TB      PT Received On 10/19/23  -TB         Time Calculation- PT    Total Timed Code Minutes- PT 24 minute(s)  -TB                User Key  (r) = Recorded By, (t) = Taken By, (c) = Cosigned By      Initials Name Provider Type    Chu Berger PTA Physical Therapist Assistant                  Therapy Charges for Today       Code Description Service Date Service Provider Modifiers Qty    88575377136 HC GAIT TRAINING EA 15 MIN  10/18/2023 Chu Tatum, DAVID GP 2    12126147599 HC GAIT TRAINING EA 15 MIN 10/19/2023 hCu Tatum, DAVID GP 2            PT G-Codes  Outcome Measure Options: AM-PAC 6 Clicks Basic Mobility (PT)  AM-PAC 6 Clicks Score (PT): 16  AM-PAC 6 Clicks Score (OT): 15    Chu Tatum PTA  10/19/2023

## 2023-10-19 NOTE — THERAPY DISCHARGE NOTE
Acute Care - Physical Therapy Discharge Summary  [facility]       Patient Name: Kim Davison  : 1935  MRN: [id number]    Today's Date: 10/19/2023  Onset of Illness/Injury or Date of Surgery: 10/16/23       Referring Physician: Dr. Mcneill      Admit Date: 10/16/2023      PT Recommendation and Plan    Visit Dx:    ICD-10-CM ICD-9-CM   1. Decreased activities of daily living (ADL)  Z78.9 V49.89   2. Confusion  R41.0 298.9   3. Acute UTI (urinary tract infection)  N39.0 599.0   4. Wound of left lower extremity, sequela  S81.802S 906.1   5. Impaired functional mobility and activity tolerance [Z74.09]  Z74.09 V49.89        Outcome Measures       Row Name 10/18/23 1337 10/17/23 0900          How much help from another person do you currently need...    Turning from your back to your side while in flat bed without using bedrails? 3  -TB --     Moving from lying on back to sitting on the side of a flat bed without bedrails? 3  -TB --     Moving to and from a bed to a chair (including a wheelchair)? 3  -TB --     Standing up from a chair using your arms (e.g., wheelchair, bedside chair)? 3  -TB --     Climbing 3-5 steps with a railing? 2  -TB --     To walk in hospital room? 3  -TB --     AM-PAC 6 Clicks Score (PT) 17  -TB --     Highest level of mobility 5 --> Static standing  -TB --        How much help from another is currently needed...    Putting on and taking off regular lower body clothing? -- 2  -EC     Bathing (including washing, rinsing, and drying) -- 2  -EC     Toileting (which includes using toilet bed pan or urinal) -- 2  -EC     Putting on and taking off regular upper body clothing -- 3  -EC     Taking care of personal grooming (such as brushing teeth) -- 3  -EC     Eating meals -- 4  -EC     AM-PAC 6 Clicks Score (OT) -- 16  -EC        Functional Assessment    Outcome Measure Options AM-PAC 6 Clicks Basic Mobility (PT)  -TB AM-PAC 6 Clicks Daily Activity (OT)  -EC               User Key  (r) =  Recorded By, (t) = Taken By, (c) = Cosigned By      Initials Name Provider Type    TB Chu Tatum PTA Physical Therapist Assistant    Windy Webster, OTR/L Occupational Therapist                     PT Charges       Row Name 10/19/23 1529             Time Calculation    Start Time 0931  -TB      Stop Time 0955  -TB      Time Calculation (min) 24 min  -TB      PT Received On 10/19/23  -TB         Time Calculation- PT    Total Timed Code Minutes- PT 24 minute(s)  -TB                User Key  (r) = Recorded By, (t) = Taken By, (c) = Cosigned By      Initials Name Provider Type    TB Chu Tatum, PTA Physical Therapist Assistant                     PT Rehab Goals       Row Name 10/19/23 1537             Bed Mobility Goal 1 (PT)    Activity/Assistive Device (Bed Mobility Goal 1, PT) rolling to left;rolling to right;scooting;sit to supine/supine to sit;sidelying to sit/sit to sidelying  -AE      Beckham Level/Cues Needed (Bed Mobility Goal 1, PT) contact guard required  -AE      Time Frame (Bed Mobility Goal 1, PT) long term goal (LTG);10 days  -AE      Progress/Outcomes (Bed Mobility Goal 1, PT) goal not met  -AE         Transfer Goal 1 (PT)    Activity/Assistive Device (Transfer Goal 1, PT) sit-to-stand/stand-to-sit;bed-to-chair/chair-to-bed;other (see comments)  rwx for bed to/from chair  -AE      Beckham Level/Cues Needed (Transfer Goal 1, PT) contact guard required  -AE      Time Frame (Transfer Goal 1, PT) long term goal (LTG);10 days  -AE      Progress/Outcome (Transfer Goal 1, PT) goal not met  -AE         Gait Training Goal 1 (PT)    Activity/Assistive Device (Gait Training Goal 1, PT) gait (walking locomotion);assistive device use;decrease fall risk;diminish gait deviation;improve balance and speed;increase endurance/gait distance;increase energy conservation;walker, rolling  -AE      Beckham Level (Gait Training Goal 1, PT) standby assist  -AE      Distance (Gait Training  Goal 1, PT) 100 ft managing rwx I w/ consistent and equal step length  -AE      Time Frame (Gait Training Goal 1, PT) long term goal (LTG);10 days  -AE      Progress/Outcome (Gait Training Goal 1, PT) goal not met  -AE                User Key  (r) = Recorded By, (t) = Taken By, (c) = Cosigned By      Initials Name Provider Type Discipline    AE Aan Avina PTA Physical Therapist Assistant PT                        PT Discharge Summary  Anticipated Discharge Disposition (PT): skilled nursing facility  Reason for Discharge: Discharge from facility  Outcomes Achieved: Patient able to partially acheive established goals  Discharge Destination: SNF      Ana Avina PTA   10/19/2023

## 2023-10-19 NOTE — THERAPY TREATMENT NOTE
Patient Name: Kim Davison  : 1935    MRN: [id number]                              Today's Date: 10/19/2023       Admit Date: 10/16/2023    Visit Dx: Therapist utilized gait belt, applied non-slipped socks, provided fall risk education/prevention, & facilitated muscle strengthening PRN to reduce patient falls risk during this session.      ICD-10-CM ICD-9-CM   1. Decreased activities of daily living (ADL)  Z78.9 V49.89   2. Confusion  R41.0 298.9   3. Acute UTI (urinary tract infection)  N39.0 599.0   4. Wound of left lower extremity, sequela  S81.802S 906.1   5. Impaired functional mobility and activity tolerance [Z74.09]  Z74.09 V49.89     Patient Active Problem List   Diagnosis    Palpitations    Abnormal EKG    Primary hypertension    Shortness of breath    Atrioventricular block, Mobitz type 1, Wenckebach    SVT (supraventricular tachycardia)    Orthostasis    Weight loss, non-intentional    Tobacco use    Decreased activities of daily living (ADL)    Urinary tract infection without hematuria due to Proteus mirabilis    Fall    Memory loss     Past Medical History:   Diagnosis Date    Abnormal EKG 2019    Atrioventricular block, Mobitz type 1, Wenckebach     Heart murmur     Hyperglycemia     Hypertension     Insomnia     Neuropathy     Orthostasis     Skin cancer     unknown what type    SVT (supraventricular tachycardia)     Syncope      Past Surgical History:   Procedure Laterality Date    APPENDECTOMY  2007    BREAST BIOPSY      CATARACT EXTRACTION      with lens implants    HEMORRHOIDECTOMY  2007    LUMBAR SPINE SURGERY  2002    SKIN CANCER EXCISION        General Information       Row Name 10/19/23 1000          OT Time and Intention    Document Type therapy note (daily note)  -MT     Mode of Treatment occupational therapy  -MT       Row Name 10/19/23 1000          General Information    Patient Profile Reviewed yes  -MT     Existing Precautions/Restrictions fall  [place]  -MT        [facility] Name 10/19/23 1000          Safety Issues, Functional Mobility    Impairments Affecting Function (Mobility) balance;cognition;endurance/activity tolerance;strength;sensation/sensory awareness  -MT     Cognitive Impairments, Mobility Safety/Performance attention;awareness, need for assistance;impulsivity;insight into deficits/self-awareness;problem-solving/reasoning;judgment;safety precaution awareness;safety precaution follow-through;sequencing abilities  -MT               User Key  (r) = Recorded By, (t) = Taken By, (c) = Cosigned By      Initials Name Provider Type    MT Ale Yoon COTA Occupational Therapist Assistant                     Mobility/ADL's       [facility] 10/19/23 1000          Bed Mobility    Comment, (Bed Mobility) in chair  -MT       Row Name 10/19/23 1000          Transfers    Transfers sit-stand transfer;stand-sit transfer  -MT       Row Name 10/19/23 1000          Sit-Stand Transfer    Sit-Stand [place] (Transfers) minimum assist (75% patient effort);verbal cues  -MT     Assistive Device (Sit-Stand Transfers) walker, front-wheeled  -MT     Comment, (Sit-Stand Transfer) cues for hand placement  -MT       Row Name 10/19/23 1000          Stand-Sit Transfer    Stand-Sit [place] (Transfers) contact guard;verbal cues  -MT       Row Name 10/19/23 1000          Functional Mobility    Functional Mobility- Ind. Level contact guard assist;verbal cues required  -MT     Functional Mobility- Device walker, front-wheeled  -MT     Functional Mobility- Comment Pt needing cues to stay within RW as she walks to the R side and keeps RW too far in front. Post cues pt with improved posture and safer fxl mobility. Pt performed mobility <> BR  -[facility] Name 10/19/23 1000          Activities of Daily Living    BADL Assessment/Intervention upper body dressing;lower body dressing;grooming  -MT       Row Name 10/19/23 1000          Lower Body Dressing Assessment/Training    [place] Level  (Lower Body Dressing) pants/bottoms;moderate assist (50% patient effort)  -MT     Position (Lower Body Dressing) supported sitting  -MT       Row Name 10/19/23 1000          Upper Body Dressing Assessment/Training    [place] Level (Upper Body Dressing) moderate assist (50% patient effort)  -MT     Position (Upper Body Dressing) supported sitting  -MT       Row Name 10/19/23 1000          Grooming Assessment/Training    Comment, (Grooming) sink side CGA s/u increased time  -MT               User Key  (r) = Recorded By, (t) = Taken By, (c) = Cosigned By      Initials Name Provider Type    Ale Moreno COTA Occupational Therapist Assistant                   Obj/Interventions    No documentation.                  Goals/Plan    No documentation.                  Clinical Impression       Row Name 10/19/23 1000          Pain Assessment    Pretreatment Pain Rating 0/10 - no pain  -MT     Posttreatment Pain Rating 0/10 - no pain  -MT       Row Name 10/19/23 1000          Therapy Plan Review/Discharge Plan (OT)    Anticipated Discharge Disposition (OT) skilled nursing facility  -MT       Row Name 10/19/23 1000          Positioning and Restraints    Pre-Treatment Position sitting in chair/recliner  -MT     Post Treatment Position chair  -MT     In Chair sitting;call light within reach;encouraged to call for assist;exit alarm on;with family/caregiver  -MT               User Key  (r) = Recorded By, (t) = Taken By, (c) = Cosigned By      Initials Name Provider Type    Ale Moreno COTA Occupational Therapist Assistant                   Outcome Measures       Row Name 10/19/23 1000          How much help from another is currently needed...    Putting on and taking off regular lower body clothing? 2  -MT     Bathing (including washing, rinsing, and drying) 2  -MT     Toileting (which includes using toilet bed pan or urinal) 2  -MT     Putting on and taking off regular upper body clothing 2  -MT     Taking care  of personal grooming (such as brushing teeth) 3  -MT     Eating meals 4  -MT     AM-PAC 6 Clicks Score (OT) 15  -MT       Row Name 10/19/23 0806          How much help from another person do you currently need...    Turning from your back to your side while in flat bed without using bedrails? 3  -SM     Moving from lying on back to sitting on the side of a flat bed without bedrails? 3  -SM     Moving to and from a bed to a chair (including a wheelchair)? 3  -SM     Standing up from a chair using your arms (e.g., wheelchair, bedside chair)? 3  -SM     Climbing 3-5 steps with a railing? 2  -SM     To walk in hospital room? 2  -SM     AM-PAC 6 Clicks Score (PT) 16  -SM     Highest level of mobility 5 --> Static standing  -SM               User Key  (r) = Recorded By, (t) = Taken By, (c) = Cosigned By      Initials Name Provider Type    MT Ale Yoon COTA Occupational Therapist Assistant    Esther Rothman, RN Registered Nurse                    Occupational Therapy Education       Title: PT OT SLP Therapies (In Progress)       Topic: Occupational Therapy (In Progress)       Point: ADL training (In Progress)       Description:   Instruct learner(s) on proper safety adaptation and remediation techniques during self care or transfers.   Instruct in proper use of assistive devices.                  Learning Progress Summary             Patient Acceptance, E, NR by MM at 10/18/2023 1442    Acceptance, E, VU,NR by EC at 10/17/2023 0942    Comment: role of OT, fall prevention, safety mechanics                         Point: Precautions (In Progress)       Description:   Instruct learner(s) on prescribed precautions during self-care and functional transfers.                  Learning Progress Summary             Patient Acceptance, E, NR by MM at 10/18/2023 1442    Acceptance, E, VU,NR by EC at 10/17/2023 0942    Comment: role of OT, fall prevention, safety mechanics                         Point: Body mechanics (In  Progress)       Description:   Instruct learner(s) on proper positioning and spine alignment during self-care, functional mobility activities and/or exercises.                  Learning Progress Summary             Patient Acceptance, E, NR by  at 10/18/2023 1442    Acceptance, E, VU,NR by  at 10/17/2023 0942    Comment: role of OT, fall prevention, safety mechanics                                         User Key       Initials Effective Dates Name Provider Type Discipline     07/11/23 -  Harsh Kingston, OTR/L Occupational Therapist OT     10/13/23 -  Windy Bianchi OTR/L Occupational Therapist OT                  OT Recommendation and Plan     Plan of Care Review  Plan of Care Reviewed With: patient  Progress: improving  Outcome Evaluation: Pt needing cues to stay within RW as she walks to the R side and keeps RW too far in front. Post cues pt with improved posture and safer fxl mobility. Pt performed mobility <> BR. Dons UB and LB Anisa. Pt demo's overall deconditioning and weakness. Pt able to stand sink side x10 mins for oral care, hair grooming, makeup application and facial hygiene. Pt needs UE support PRN and demo's forward flexed posture, did require RB prior to mobility to room. Pt demo's some confusion and impulsivity needing cues for safety with tasks. Recommend continued rehab     Time Calculation:         Time Calculation- OT       Row Name 10/19/23 1000             Time Calculation- OT    OT Start Time 1000  -MT      OT Stop Time 1039  -MT      OT Time Calculation (min) 39 min  -MT      Total Timed Code Minutes- OT 39 minute(s)  -MT      OT Received On 10/19/23  -MT         Timed Charges    96062 - OT Self Care/Mgmt Minutes 39  -MT         Total Minutes    Timed Charges Total Minutes 39  -MT       Total Minutes 39  -MT                User Key  (r) = Recorded By, (t) = Taken By, (c) = Cosigned By      Initials Name Provider Type    MT Ale Yoon COTA Occupational Therapist Assistant                   Therapy Charges for Today       Code Description Service Date Service Provider Modifiers Qty    [id number] HC OT SELF CARE/MGMT/TRAIN EA 15 MIN 10/19/2023 Ale Yoon COTA GO 3                 ZENOBIA Jain  10/19/2023

## 2023-10-19 NOTE — PLAN OF CARE
Goal Outcome Evaluation:  Plan of Care Reviewed With: patient        Progress: improving  Outcome Evaluation: Pt needing cues to stay within RW as she walks to the R side and keeps RW too far in front. Post cues pt with improved posture and safer fxl mobility. Pt performed mobility <> BR. Dons UB and LB ModA. Pt demo's overall deconditioning and weakness. Pt able to stand sink side x10 mins for oral care, hair grooming, makeup application and facial hygiene. Pt needs UE support PRN and demo's forward flexed posture, did require RB prior to mobility to room. Pt demo's some confusion and impulsivity needing cues for safety with tasks. Recommend continued rehab

## 2023-10-19 NOTE — PLAN OF CARE
Problem: Adult Inpatient Plan of Care  Goal: Plan of Care Review  Outcome: Met  Flowsheets (Taken 10/19/2023 1122)  Outcome Evaluation: Patient RA> IV CDI, IVF infusing per order. Up x1 with walker. Incontinent at times. No c/o pain. Sitting up in chair. Drsg sites CDI. VSS, safety maintained.

## 2023-10-19 NOTE — THERAPY DISCHARGE NOTE
Acute Care - Occupational Therapy Discharge Summary  [facility]     Patient Name: Kim Davison  : 1935  MRN: [id number]    Today's Date: 10/19/2023  Onset of Illness/Injury or Date of Surgery: 10/16/23    Date of Referral to OT: 10/16/23  Referring Physician: Dr. Mcneill      Admit Date: 10/16/2023        OT Recommendation and Plan    Visit Dx:    ICD-10-CM ICD-9-CM   1. Decreased activities of daily living (ADL)  Z78.9 V49.89   2. Confusion  R41.0 298.9   3. Acute UTI (urinary tract infection)  N39.0 599.0   4. Wound of left lower extremity, sequela  S81.802S 906.1   5. Impaired functional mobility and activity tolerance [Z74.09]  Z74.09 V49.89          Time Calculation- OT       Row Name 10/19/23 1000             Time Calculation- OT    OT Start Time 1000  -MT      OT Stop Time 1039  -MT      OT Time Calculation (min) 39 min  -MT      Total Timed Code Minutes- OT 39 minute(s)  -MT      OT Received On 10/19/23  -MT         Timed Charges    67101 - OT Self Care/Mgmt Minutes 39  -MT         Total Minutes    Timed Charges Total Minutes 39  -MT       Total Minutes 39  -MT                User Key  (r) = Recorded By, (t) = Taken By, (c) = Cosigned By      Initials Name Provider Type    MT Ale Yoon COTA Occupational Therapist Assistant                       OT Rehab Goals       Row Name 10/19/23 1400             Transfer Goal 1 (OT)    Activity/Assistive Device (Transfer Goal 1, OT) sit-to-stand/stand-to-sit;bed-to-chair/chair-to-bed;toilet;shower chair;commode;commode, bedside with drop arms  -EC      [place] Level/Cues Needed (Transfer Goal 1, OT) modified independence  -EC      Time Frame (Transfer Goal 1, OT) long term goal (LTG)  -EC      Progress/Outcome (Transfer Goal 1, OT) goal not met  -EC         Bathing Goal 1 (OT)    Activity/Device (Bathing Goal 1, OT) upper body bathing;lower body bathing;grab bar, tub/shower;shower chair  -EC      [place] Level/Cues Needed (Bathing Goal 1, OT)  minimum assist (75% or more patient effort)  -EC      Time Frame (Bathing Goal 1, OT) long term goal (LTG)  -EC      Progress/Outcomes (Bathing Goal 1, OT) goal not met  -EC         Toileting Goal 1 (OT)    Activity/Device (Toileting Goal 1, OT) adjust/manage clothing;perform perineal hygiene;commode;commode, bedside with drop arms;grab bar/safety frame  -EC      [place] Level/Cues Needed (Toileting Goal 1, OT) contact guard required  -EC      Time Frame (Toileting Goal 1, OT) long term goal (LTG)  -EC      Progress/Outcome (Toileting Goal 1, OT) goal not met  -EC                User Key  (r) = Recorded By, (t) = Taken By, (c) = Cosigned By      Initials Name Provider Type Discipline    EC Windy Bianchi, OTR/L Occupational Therapist OT                     Outcome Measures       Row Name 10/18/23 1337 10/17/23 0900          How much help from another person do you currently need...    Turning from your back to your side while in flat bed without using bedrails? 3  -TB --     Moving from lying on back to sitting on the side of a flat bed without bedrails? 3  -TB --     Moving to and from a bed to a chair (including a wheelchair)? 3  -TB --     Standing up from a chair using your arms (e.g., wheelchair, bedside chair)? 3  -TB --     Climbing 3-5 steps with a railing? 2  -TB --     To walk in hospital room? 3  -TB --     AM-PAC 6 Clicks Score (PT) 17  -TB --     Highest level of mobility 5 --> Static standing  -TB --        How much help from another is currently needed...    Putting on and taking off regular lower body clothing? -- 2  -EC     Bathing (including washing, rinsing, and drying) -- 2  -EC     Toileting (which includes using toilet bed pan or urinal) -- 2  -EC     Putting on and taking off regular upper body clothing -- 3  -EC     Taking care of personal grooming (such as brushing teeth) -- 3  -EC     Eating meals -- 4  -EC     AM-PAC 6 Clicks Score (OT) -- 16  -EC        Functional Assessment     Outcome Measure Options AM-PAC 6 Clicks Basic Mobility (PT)  -TB AM-PAC 6 Clicks Daily Activity (OT)  -EC               User Key  (r) = Recorded By, (t) = Taken By, (c) = Cosigned By      Initials Name Provider Type    Chu Berger, PTA Physical Therapist Assistant    EC Windy Bianchi OTR/L Occupational Therapist                    Timed Therapy Charges  Total Units: 3      Suggested Charges  Total Units: 3      Procedure Name Documented Minutes Units Code    HC OT SELF CARE/MGMT/TRAIN EA 15 MIN 39 3   12684 (CPT®)                 Documented Minutes  Total Minutes: 39      Therapy Provided Minutes    58480 - OT Self Care/Mgmt Minutes 39                        OT Discharge Summary  Anticipated Discharge Disposition (OT): skilled nursing facility  Reason for Discharge: Discharge from facility  Outcomes Achieved: Refer to plan of care for updates on goals achieved  Discharge Destination: SNF      GERMANIA Donnelly  10/19/2023

## 2023-10-19 NOTE — CASE MANAGEMENT/SOCIAL WORK
Continued Stay Note   [place]     Patient Name: Kim Davison  MRN: [id number]  Today's Date: 10/19/2023    Admit Date: 10/16/2023    Plan: [facility]   Discharge Plan       Row Name 10/19/23 0955       Plan    Plan [facility]    Patient/Family in Agreement with Plan yes    Final Discharge Disposition Code 03 - skilled nursing facility (SNF)    Final Note Pt is being d/c'ed to [facility] [phone] today. Faxed the dc summary to [phone]. She does not need a covid swab. Family will need to transport.                   Discharge Codes    No documentation.                 Expected Discharge Date and Time       Expected Discharge Date Expected Discharge Time    Oct 19, 2023               [facility]

## 2023-10-19 NOTE — DISCHARGE SUMMARY
[facility] Medicine Services  DISCHARGE SUMMARY     Date of Admission: 10/16/2023  Date of Discharge:  10/19/2023  Primary Care Physician: Winifred Kramer APRN    Presenting Problem/History of Present Illness:  Fall at home, increased difficulty ambulating    Final Discharge Diagnoses:  Active Hospital Problems    Diagnosis     **Decreased activities of daily living (ADL)     Urinary tract infection without hematuria due to Proteus mirabilis     Fall     Memory loss     Primary hypertension      Consults: None    Procedures Performed: None    Pertinent Test Results:   Results for orders placed in visit on 02/14/19    Adult Transthoracic Echo Complete W/ Cont if Necessary Per Protocol      Imaging Results (All)       Procedure Component Value Units Date/Time    CT Head Without Contrast [[id number]] Collected: 10/17/23 0808     Updated: 10/17/23 0815    Narrative:      EXAMINATION: CT HEAD WO CONTRAST-      10/17/2023 7:58 AM CDT     HISTORY: Mental status change, unknown cause; Z78.9-Other specified  health status; R41.0-Disorientation, unspecified; N39.0-Urinary tract  infection, site not specified; S81.802S-Unspecified open wound, left  lower leg, sequela     In order to have a CT radiation dose as low as reasonably achievable  Automated Exposure Control was utilized for adjustment of the mA and/or  KV according to patient size.     DLP in mGycm= 614     The CT scan of the head is performed without intravenous contrast  enhancement.     Images are acquired in axial plane and subsequent reconstruction in  coronal and sagittal planes.     There is no previous similar study for comparison.     There is no evidence of a mass. There is no midline shift.     There is no evidence of an intracranial hemorrhage or hematoma.     Significantly dilated ventricles are seen out of proportion to  moderately dilated basal cisterns and the cortical sulci which may  suggest an element of  hydrocephalus in addition to chronic atrophy.     Scattered areas of chronic ischemia in the white matter bilaterally. The  gray-white matter differentiation is maintained.     There is heavy calcification of the anterior falx.     The images reviewed in bone window show no evidence of a skull fracture.  Limited visualized paranasal sinuses and mastoid air cells are  unremarkable.       Impression:      1. No acute intracranial abnormality.  2. Chronic atrophy and communicating hydrocephalus.  3. 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      This report was signed and finalized on 10/17/2023 8:12 AM CDT by Dr. Pratima Willis MD.       XR Chest 1 View [[id number]] Collected: 10/16/23 1248     Updated: 10/16/23 1254    Narrative:      EXAMINATION: XR CHEST 1 VW-     10/16/2023 12:38 PM CDT     HISTORY: Weakness     COMPARISON:  3/21/2021.     1 view chest x-ray.     Heart size is normal.  Aortic arch calcification.  The mediastinum is within normal limits.     The lungs are normally expanded with no pneumonia or pneumothorax.     No congestive failure changes.     Osteopenic bones.  High-grade degenerative change at each shoulder.       Impression:      1. No acute lung disease.           This report was signed and finalized on 10/16/2023 12:51 PM CDT by Dr. Leonardo Cutler MD.             LAB RESULTS:      Lab 10/17/23  0552 10/16/23  1203   WBC 5.68 5.20   HEMOGLOBIN 12.5 13.0   HEMATOCRIT 37.4 39.9   PLATELETS 386 397   NEUTROS ABS 3.17 2.87   IMMATURE GRANS (ABS) 0.05 0.04   LYMPHS ABS 1.65 1.56   MONOS ABS 0.53 0.49   EOS ABS 0.23 0.18   MCV 87.2 89.5   PROCALCITONIN  --  0.06   LACTATE  --  1.0         Lab 10/17/23  0552 10/16/23  1203   SODIUM 137 140   POTASSIUM 3.6 4.1   CHLORIDE 104 105   CO2 22.0 25.0   ANION GAP 11.0 10.0   BUN 13 21   CREATININE 0.59 0.89   EGFR 87.9 63.2   GLUCOSE 106* 103*   CALCIUM 8.9 9.4   MAGNESIUM  --  2.1         Lab 10/17/23  0552  10/16/23  1203   TOTAL PROTEIN 6.1 6.5   ALBUMIN 3.3* 3.7   GLOBULIN 2.8 2.8   ALT (SGPT) 7 8   AST (SGOT) 10 11   BILIRUBIN 0.6 0.5   ALK PHOS 108 114                     Brief Urine Lab Results  (Last result in the past 365 days)        Color   Clarity   Blood   Leuk Est   Nitrite   Protein   CREAT   Urine HCG        10/16/23 1223 Dark Yellow   Turbid   Trace   Large (3+)   Positive   >=300 mg/dL (3+)                 Microbiology Results (last 10 days)       Procedure Component Value - Date/Time    Blood Culture - Blood, Arm, Left [[id number]]  (Normal) Collected: 10/16/23 1240    Lab Status: Preliminary result Specimen: Blood from Arm, Left Updated: 10/18/23 1300     Blood Culture No growth at 2 days    Urine Culture - Urine, Straight Cath [[id number]]  (Abnormal)  (Susceptibility) Collected: 10/16/23 1223    Lab Status: Final result Specimen: Urine from Straight Cath Updated: 10/18/23 1038     Urine Culture >100,000 CFU/mL Proteus mirabilis    Narrative:      Colonization of the urinary tract without infection is common. Treatment is discouraged unless the patient is symptomatic, pregnant, or undergoing an invasive urologic procedure.    Susceptibility        Proteus mirabilis      GENIA      Ampicillin Susceptible      Ampicillin + Sulbactam Susceptible      Cefazolin Susceptible      Cefepime Susceptible      Ceftazidime Susceptible      Ceftriaxone Susceptible      Gentamicin Susceptible      Levofloxacin Susceptible      Nitrofurantoin Resistant      Piperacillin + Tazobactam Susceptible      Trimethoprim + Sulfamethoxazole Susceptible                           Blood Culture - Blood, Arm, Right [[id number]]  (Normal) Collected: 10/16/23 1203    Lab Status: Preliminary result Specimen: Blood from Arm, Right Updated: 10/18/23 1216     Blood Culture No growth at 2 days          Hospital Course: Ms. Davison presented to [facility] emergency room 10/16/2023 accompanied by family members.  Patient normally  ambulates with a walker, able to get up from seated position.  However, over the last 2 to 3 weeks patient has had increasing difficulty with ambulation and has had increased falls.  Patient reported increased urinary tract difficulties and incontinence of urine.  Patient reported burning when she urinated.  She denied any blood in her urine.  She was noted to have abrasion to right knee and left lateral lower leg and has been followed by wound care in [place].  Urinalysis too numerous to count WBC, 4+ bacteria, positive nitrate.  Procalcitonin 0.06.  WBC 5.2.  Chest x-ray no acute disease.  Rocephin given in ER.    She was admitted to the medical floor with urinary tract infection without hematuria and recurrent fall.  Urinalysis 4+ bacteria, too numerous to count WBC and positive nitrate.  Rocephin continued on admission.  Urine culture positive for Proteus mirabilis sensitive to Rocephin.  Patient remained on Rocephin for 3 days and then transitioned to oral Omnicef 300 mg twice daily for total 7-day antibiotic treatment.  Discontinue Omnicef after last dose on 10/22/23.    Blood pressure elevated 194/87 in ER.  Metoprolol and hydrochlorothiazide continued.  Lisinopril substituted for benazepril.  Blood pressure 152/76 prior to discharge.    Physical therapy and Occupational Therapy consulted for decreased activities of daily living and recurrent fall.  Patient was able to ambulate 75 feet with rolling walker and contact-guard.  Patient needs assistant with rolling walker at times.  She veers to the left and requires cues to pick her feet up while walking.  Skilled nursing facility recommended and family agreed to [facility] at discharge.    SCDs ordered for deep vein thrombosis prophylaxis.    On 10/19/2023, she is stable for discharge to [facility] for ongoing physical therapy and Occupational Therapy prior to discharge home.  Continue Omnicef 300 mg twice daily and discontinue after last dose on 10/22/2023.  " Follow-up with Dr. Winifred Kramer, APRN 1 week.    Physical Exam on Discharge:  /76 (BP Location: Left arm, Patient Position: Lying)   Pulse 73   Temp 97.8 °F (36.6 °C) (Oral)   Resp 16   Ht 160 cm (63\")   Wt 53.5 kg (118 lb)   SpO2 100%   BMI 20.90 kg/m²   Physical Exam  Vitals and nursing note reviewed.   Constitutional:       Comments: Sitting up in bed.  No oxygen in use.  No visitors in room.   HENT:      Head: Normocephalic and atraumatic.      Nose: No congestion.      Mouth/Throat:      Pharynx: Oropharynx is clear. No oropharyngeal exudate or posterior oropharyngeal erythema.   Eyes:      Extraocular Movements: Extraocular movements intact.      Pupils: Pupils are equal, round, and reactive to light.   Cardiovascular:      Rate and Rhythm: Normal rate and regular rhythm.      Heart sounds: No murmur heard.  Pulmonary:      Breath sounds: No wheezing, rhonchi or rales.      Comments: No oxygen in use.  Abdominal:      Palpations: Abdomen is soft.      Tenderness: There is no abdominal tenderness.   Genitourinary:     Comments: Voiding.  Musculoskeletal:         General: No swelling or tenderness.      Cervical back: Normal range of motion and neck supple.   Skin:     General: Skin is warm and dry.      Comments: Abrasion right knee, 1 cm left lateral lower extremity nonhealing area.   Neurological:      Comments: Oriented to person.  Tells me her name.  Tells me her daughter's name and her address.   Psychiatric:         Mood and Affect: Mood normal.         Behavior: Behavior normal.         Thought Content: Thought content normal.         Judgment: Judgment normal.       Condition on Discharge: Stable for discharge to skilled nursing facility    Discharge Disposition:  Skilled Nursing Facility (DC - External)    Discharge Medications:     Discharge Medications        New Medications        Instructions Start Date   acetaminophen 325 MG tablet  Commonly known as: TYLENOL   650 mg, Oral, Every 4 " Hours PRN      cefdinir 300 MG capsule  Commonly known as: OMNICEF   300 mg, Oral, Every 12 Hours Scheduled for 4 days begin 10/19/2023.  Discontinue after last dose on 10/22/2023.             Changes to Medications        Instructions Start Date   megestrol 40 MG/ML suspension  Commonly known as: MEGACE  What changed: See the new instructions.   200 mg, Oral, Every 8 Hours             Continue These Medications        Instructions Start Date   benazepril 20 MG tablet  Commonly known as: LOTENSIN   Take 1 tablet by mouth Daily.      busPIRone 10 MG tablet  Commonly known as: BUSPAR   10 mg, Oral, 3 Times Daily      citalopram 10 MG tablet  Commonly known as: CeleXA   10 mg, Oral, Daily      hydroCHLOROthiazide 12.5 MG tablet  Commonly known as: HYDRODIURIL   12.5 mg, Oral, Daily      hydrOXYzine 10 MG tablet  Commonly known as: ATARAX   10 mg, Oral, 3 Times Daily PRN      metoprolol tartrate 25 MG tablet  Commonly known as: LOPRESSOR   25 mg, Oral, 2 Times Daily      mupirocin 2 % ointment  Commonly known as: BACTROBAN   1 application , Topical, 3 Times Daily      pregabalin 75 MG capsule  Commonly known as: LYRICA   75 mg, Oral, 2 Times Daily      vitamin B-12 1000 MCG tablet  Commonly known as: CYANOCOBALAMIN   1,000 mcg, Oral, Daily      vitamin D3 125 MCG (5000 UT) capsule capsule   5,000 Units, Oral, Daily             Stop These Medications      zolpidem 10 MG tablet  Commonly known as: AMBIEN            Discharge Diet:   Diet Instructions       Diet: Regular/House Diet; Regular Texture (IDDSI 7); Thin (IDDSI 0)      Discharge Diet: Regular/House Diet    Texture: Regular Texture (IDDSI 7)    Fluid Consistency: Thin (IDDSI 0)          Activity at Discharge:   Activity Instructions       Activity as Tolerated            Discharge instructions:  For recurrent fall seek medical attention.  Use walker with activity for safety  Omnicef 300 mg twice daily for 4 days start 10/19/2023.  Discontinue after last dose  10/22/2023.  Physical therapy and Occupational Therapy twice daily  Follow-up with Winifred CARBAJAL 1 week    Follow-up Appointments:   [facility] 1 week    Test Results Pending at Discharge: None    Electronically signed by RAVEN French, 10/19/23, 09:27 CDT.    Time: 35 minutes.  Discussed with Dr. Mcneill and patient.    The above documentation resulted from a face-to-face encounter by me Ember CARBAJAL, [facility].

## 2023-10-19 NOTE — PLAN OF CARE
Goal Outcome Evaluation:   UTI - on rocephin; start omnicef in am  Impaired skin integrity - dressing changes per orders on LFA, R knee and L lower leg  HTN - bp better; on lisinopril 20 mg qd and metoprolol 25 mg bid; vs q 4h

## 2023-10-21 LAB
BACTERIA SPEC AEROBE CULT: NORMAL
BACTERIA SPEC AEROBE CULT: NORMAL